# Patient Record
Sex: MALE | Race: BLACK OR AFRICAN AMERICAN | NOT HISPANIC OR LATINO | Employment: OTHER | URBAN - METROPOLITAN AREA
[De-identification: names, ages, dates, MRNs, and addresses within clinical notes are randomized per-mention and may not be internally consistent; named-entity substitution may affect disease eponyms.]

---

## 2023-03-15 PROBLEM — F10.10 ALCOHOL ABUSE: Status: ACTIVE | Noted: 2023-03-15

## 2023-03-15 PROBLEM — E78.5 HYPERLIPIDEMIA: Status: ACTIVE | Noted: 2023-03-15

## 2023-03-15 PROBLEM — D64.9 HEMOGLOBIN LOW: Status: ACTIVE | Noted: 2023-03-15

## 2023-03-15 PROBLEM — E04.1 LEFT THYROID NODULE: Status: ACTIVE | Noted: 2023-03-15

## 2023-03-15 PROBLEM — E79.0 HYPERURICEMIA: Status: ACTIVE | Noted: 2023-03-15

## 2023-03-15 PROBLEM — R91.1 SOLITARY PULMONARY NODULE ON LUNG CT: Status: ACTIVE | Noted: 2023-03-15

## 2023-03-15 PROBLEM — N18.31 CKD STAGE G3A/A2, GFR 45-59 AND ALBUMIN CREATININE RATIO 30-299 MG/G (MULTI): Status: ACTIVE | Noted: 2023-03-15

## 2023-03-15 PROBLEM — D12.6 TUBULAR ADENOMA OF COLON: Status: ACTIVE | Noted: 2023-03-15

## 2023-03-15 PROBLEM — E04.2 MULTIPLE THYROID NODULES: Status: ACTIVE | Noted: 2023-03-15

## 2023-03-15 PROBLEM — F41.9 ANXIETY AND DEPRESSION: Status: ACTIVE | Noted: 2023-03-15

## 2023-03-15 PROBLEM — R80.9 PROTEINURIA: Status: ACTIVE | Noted: 2023-03-15

## 2023-03-15 PROBLEM — N40.0 BENIGN ENLARGEMENT OF PROSTATE: Status: ACTIVE | Noted: 2023-03-15

## 2023-03-15 PROBLEM — M25.579 ARTHRALGIA OF ANKLE: Status: ACTIVE | Noted: 2023-03-15

## 2023-03-15 PROBLEM — R90.82 WHITE MATTER DISEASE, UNSPECIFIED: Status: ACTIVE | Noted: 2023-03-15

## 2023-03-15 PROBLEM — D64.9 ANEMIA: Status: ACTIVE | Noted: 2023-03-15

## 2023-03-15 PROBLEM — N18.1 BENIGN HYPERTENSION WITH CKD (CHRONIC KIDNEY DISEASE) STAGE I: Status: ACTIVE | Noted: 2023-03-15

## 2023-03-15 PROBLEM — I10 HYPERTENSION: Status: ACTIVE | Noted: 2023-03-15

## 2023-03-15 PROBLEM — D47.2 MGUS (MONOCLONAL GAMMOPATHY OF UNKNOWN SIGNIFICANCE): Status: ACTIVE | Noted: 2023-03-15

## 2023-03-15 PROBLEM — M15.0 PRIMARY OSTEOARTHRITIS INVOLVING MULTIPLE JOINTS: Status: ACTIVE | Noted: 2023-03-15

## 2023-03-15 PROBLEM — I95.89 IATROGENIC HYPOTENSION: Status: ACTIVE | Noted: 2023-03-15

## 2023-03-15 PROBLEM — D52.0 ANEMIA, MACROCYTIC, NUTRITIONAL: Status: ACTIVE | Noted: 2023-03-15

## 2023-03-15 PROBLEM — N28.9 RENAL INSUFFICIENCY: Status: ACTIVE | Noted: 2023-03-15

## 2023-03-15 PROBLEM — I12.9 BENIGN HYPERTENSION WITH CKD (CHRONIC KIDNEY DISEASE) STAGE I: Status: ACTIVE | Noted: 2023-03-15

## 2023-03-15 PROBLEM — E55.9 VITAMIN D DEFICIENCY: Status: ACTIVE | Noted: 2023-03-15

## 2023-03-15 PROBLEM — I71.40 ANEURYSM OF ABDOMINAL AORTA (CMS-HCC): Status: ACTIVE | Noted: 2023-03-15

## 2023-03-15 PROBLEM — F32.A ANXIETY AND DEPRESSION: Status: ACTIVE | Noted: 2023-03-15

## 2023-03-15 PROBLEM — M15.9 PRIMARY OSTEOARTHRITIS INVOLVING MULTIPLE JOINTS: Status: ACTIVE | Noted: 2023-03-15

## 2023-03-15 PROBLEM — R19.5 POSITIVE OCCULT STOOL BLOOD TEST: Status: ACTIVE | Noted: 2023-03-15

## 2023-03-15 PROBLEM — M13.0 ARTHRITIS OF MULTIPLE SITES: Status: ACTIVE | Noted: 2023-03-15

## 2023-03-15 PROBLEM — I73.9 PERIPHERAL VASCULAR DISEASE (CMS-HCC): Status: ACTIVE | Noted: 2023-03-15

## 2023-03-15 PROBLEM — I73.9 INTERMITTENT CLAUDICATION (CMS-HCC): Status: ACTIVE | Noted: 2023-03-15

## 2023-03-15 RX ORDER — METOPROLOL SUCCINATE 25 MG/1
1 TABLET, EXTENDED RELEASE ORAL DAILY
COMMUNITY
Start: 2020-08-19 | End: 2023-03-16 | Stop reason: SDUPTHER

## 2023-03-15 RX ORDER — AMLODIPINE BESYLATE 5 MG/1
1 TABLET ORAL DAILY
COMMUNITY
Start: 2018-04-25 | End: 2023-03-16 | Stop reason: SDUPTHER

## 2023-03-15 RX ORDER — ROSUVASTATIN CALCIUM 5 MG/1
1 TABLET, COATED ORAL DAILY
COMMUNITY
End: 2023-03-19 | Stop reason: SDUPTHER

## 2023-03-15 RX ORDER — ASPIRIN 81 MG/1
1 TABLET ORAL DAILY
COMMUNITY
Start: 2017-02-08

## 2023-03-15 RX ORDER — ALLOPURINOL 300 MG/1
1 TABLET ORAL DAILY
COMMUNITY
Start: 2014-06-08 | End: 2023-03-16 | Stop reason: SDUPTHER

## 2023-03-15 RX ORDER — FERROUS SULFATE 325(65) MG
TABLET ORAL
COMMUNITY
Start: 2016-12-09

## 2023-03-16 ENCOUNTER — OFFICE VISIT (OUTPATIENT)
Dept: PRIMARY CARE | Facility: CLINIC | Age: 78
End: 2023-03-16
Payer: MEDICARE

## 2023-03-16 VITALS
DIASTOLIC BLOOD PRESSURE: 80 MMHG | BODY MASS INDEX: 28.94 KG/M2 | HEART RATE: 70 BPM | WEIGHT: 196 LBS | SYSTOLIC BLOOD PRESSURE: 122 MMHG

## 2023-03-16 DIAGNOSIS — E55.9 VITAMIN D DEFICIENCY: ICD-10-CM

## 2023-03-16 DIAGNOSIS — K21.00 GASTROESOPHAGEAL REFLUX DISEASE WITH ESOPHAGITIS WITHOUT HEMORRHAGE: ICD-10-CM

## 2023-03-16 DIAGNOSIS — N28.9 RENAL INSUFFICIENCY: ICD-10-CM

## 2023-03-16 DIAGNOSIS — F32.A ANXIETY AND DEPRESSION: ICD-10-CM

## 2023-03-16 DIAGNOSIS — E79.0 HYPERURICEMIA: ICD-10-CM

## 2023-03-16 DIAGNOSIS — R09.A2 GLOBUS SENSATION: ICD-10-CM

## 2023-03-16 DIAGNOSIS — N18.1 BENIGN HYPERTENSION WITH CKD (CHRONIC KIDNEY DISEASE) STAGE I: ICD-10-CM

## 2023-03-16 DIAGNOSIS — I12.9 BENIGN HYPERTENSION WITH CKD (CHRONIC KIDNEY DISEASE) STAGE I: ICD-10-CM

## 2023-03-16 DIAGNOSIS — F41.9 ANXIETY AND DEPRESSION: ICD-10-CM

## 2023-03-16 DIAGNOSIS — Z12.11 COLON CANCER SCREENING: ICD-10-CM

## 2023-03-16 DIAGNOSIS — E78.2 MIXED HYPERLIPIDEMIA: Primary | ICD-10-CM

## 2023-03-16 DIAGNOSIS — N18.31 CKD STAGE G3A/A2, GFR 45-59 AND ALBUMIN CREATININE RATIO 30-299 MG/G (MULTI): ICD-10-CM

## 2023-03-16 PROCEDURE — 3079F DIAST BP 80-89 MM HG: CPT | Performed by: FAMILY MEDICINE

## 2023-03-16 PROCEDURE — 3074F SYST BP LT 130 MM HG: CPT | Performed by: FAMILY MEDICINE

## 2023-03-16 PROCEDURE — 1036F TOBACCO NON-USER: CPT | Performed by: FAMILY MEDICINE

## 2023-03-16 PROCEDURE — 1159F MED LIST DOCD IN RCRD: CPT | Performed by: FAMILY MEDICINE

## 2023-03-16 PROCEDURE — 1160F RVW MEDS BY RX/DR IN RCRD: CPT | Performed by: FAMILY MEDICINE

## 2023-03-16 PROCEDURE — 99214 OFFICE O/P EST MOD 30 MIN: CPT | Performed by: FAMILY MEDICINE

## 2023-03-16 RX ORDER — ALLOPURINOL 300 MG/1
300 TABLET ORAL DAILY
Qty: 90 TABLET | Refills: 1 | Status: SHIPPED | OUTPATIENT
Start: 2023-03-16 | End: 2023-08-24 | Stop reason: SDUPTHER

## 2023-03-16 RX ORDER — AMLODIPINE BESYLATE 5 MG/1
5 TABLET ORAL DAILY
Qty: 90 TABLET | Refills: 1 | Status: SHIPPED | OUTPATIENT
Start: 2023-03-16 | End: 2023-08-24 | Stop reason: SDUPTHER

## 2023-03-16 RX ORDER — METOPROLOL SUCCINATE 25 MG/1
25 TABLET, EXTENDED RELEASE ORAL DAILY
Qty: 90 TABLET | Refills: 1 | Status: SHIPPED | OUTPATIENT
Start: 2023-03-16 | End: 2023-08-24 | Stop reason: SDUPTHER

## 2023-03-16 ASSESSMENT — PATIENT HEALTH QUESTIONNAIRE - PHQ9
SUM OF ALL RESPONSES TO PHQ9 QUESTIONS 1 AND 2: 0
2. FEELING DOWN, DEPRESSED OR HOPELESS: NOT AT ALL
1. LITTLE INTEREST OR PLEASURE IN DOING THINGS: NOT AT ALL

## 2023-03-16 ASSESSMENT — ENCOUNTER SYMPTOMS
LOSS OF SENSATION IN FEET: 0
DEPRESSION: 0
OCCASIONAL FEELINGS OF UNSTEADINESS: 0

## 2023-03-16 NOTE — PROGRESS NOTES
Subjective   Patient ID: Raúl Cruz is a 77 y.o. male who presents for Hyperlipidemia and Hypertension.    HPI  Victor Manuel's wife passed away in September 2022, and he continues to have anxiety, misses his wife greatly, and finds that his physical activity is rather sedentary because he feels that since he is retired, and his wife is now gone, so he has nothing to do, therefore, he does not want to do much.    Denies any chest pain, shortness of breath any other cardiac/respiratory concerns, requesting refills on medications that he is currently taking.    In addition to his prescription medications, he is taking a vitamin B complex as well.    2016 had a colonoscopy and upper endoscopy, with a 5-year clearance for his colonoscopy but he has yet to do the colonoscopy mainly because he was concerned about the prep for it.  Knowing that they have tablets for the colonoscopy preparation, he is a bit more inclined to consider having the colonoscopy done.    Initially when he made this appointment, he was concerned about a possible white spot on the back of his throat, but he describes it as almost a marble appearing object, usually seen when he opens his mouth widely, and it has a glistening surface.  He feels like he has something stuck in his throat, but it is below the Diego's apple, not in the back of the throat.  Denies any sore throat, fever or chills, or other constitutional symptoms.  In fact, he has no difficulty swallowing, no painful swallowing, and denies any reflux-like symptoms.    Victor Manuel has a history of hyperuricemia, hypertension, and hyperlipidemia.  Willing to have blood work done, but not fasting today.    Review of Systems  All pertinent positive symptoms are included in the history of present illness.    All other systems have been reviewed and are negative and noncontributory to this patient's current ailments.     Allergies   Allergen Reactions    Pollen Extracts Itching and Unknown         Immunization History   Administered Date(s) Administered    Influenza, High Dose Seasonal, Preservative Free 11/01/2022    Pneumococcal Polysaccharide PPSV23 07/23/2019    Tdap 11/21/2016, 08/03/2022       Objective   Vitals:    03/16/23 1105   BP: 122/80   Pulse: 70   Weight: 88.9 kg (196 lb)       Physical Exam  CONSTITUTIONAL - well nourished, well developed, looks like stated age, in no acute distress, not ill-appearing, and not tired appearing  SKIN - normal skin color and pigmentation, normal skin turgor without rash, lesions, or nodules visualized  HEAD - no trauma, normocephalic  EYES - pupils are equal and reactive to light, extraocular muscles are intact, and normal external exam  NECK - supple without rigidity, no neck mass was observed, no thyromegaly or thyroid nodules  CHEST - clear to auscultation, no wheezing, no crackles and no rales, good effort  CARDIAC - regular rate and regular rhythm, no skipped beats, no murmur  ABDOMEN - no organomegaly, soft, nontender, nondistended, normal bowel sounds, no guarding/rebound/rigidity, negative McBurney sign and negative Quick sign  EXTREMITIES - no edema, no deformities  PSYCHIATRIC - alert, pleasant and cordial, age-appropriate  IMMUNOLOGIC - no cervical lymphadenopathy     Assessment/Plan   1.  Hyperlipidemia.  Please obtain fasting blood work, we will send medication over once I review blood work to determine appropriate dosing    2.  Hyperuricemia.  Uric acid level pending, we will review and send medication over accordingly    3.  Anxiety and depression.  I am so sorry to hear about the loss of your wife  I suspect that she is in a much better place than on our planet, and certainly is not suffering anymore  Not using medication for this concern currently, but if things change, and you need my help, do not hesitate to contact me    4.  CKD.  We will monitor kidney function to ensure stable  We will notify of test results once available and make  treatment recommendations accordingly    5.  Globus sensation/GERD.  I am not certain if there is any anatomical pathologic process with the feeling that something is in your throat  You have been under a great deal of stress since your wife passed, as everything is changing, and now you are responsible for so many different things that you were not responsible before her death  Because we talked about considering a screening colonoscopy, which is due, I am going to suggest an upper endoscopy to further evaluate the esophagus and if there is pathology, it will be evaluated properly with further recommendations to follow    6.  Colonoscopy screening.  You are due in 2021, you have been hesitant to do it, but reluctantly you have allow me to place the order so I will do so    7.  Hypertension.  Stable, no changes to medication recommended

## 2023-03-17 ENCOUNTER — LAB (OUTPATIENT)
Dept: LAB | Facility: LAB | Age: 78
End: 2023-03-17
Payer: MEDICARE

## 2023-03-17 DIAGNOSIS — E55.9 VITAMIN D DEFICIENCY: ICD-10-CM

## 2023-03-17 DIAGNOSIS — E78.2 MIXED HYPERLIPIDEMIA: ICD-10-CM

## 2023-03-17 DIAGNOSIS — F32.A ANXIETY AND DEPRESSION: ICD-10-CM

## 2023-03-17 DIAGNOSIS — N18.31 CKD STAGE G3A/A2, GFR 45-59 AND ALBUMIN CREATININE RATIO 30-299 MG/G (MULTI): ICD-10-CM

## 2023-03-17 DIAGNOSIS — E79.0 HYPERURICEMIA: ICD-10-CM

## 2023-03-17 DIAGNOSIS — F41.9 ANXIETY AND DEPRESSION: ICD-10-CM

## 2023-03-17 DIAGNOSIS — N28.9 RENAL INSUFFICIENCY: ICD-10-CM

## 2023-03-17 LAB
ALANINE AMINOTRANSFERASE (SGPT) (U/L) IN SER/PLAS: 16 U/L (ref 10–52)
ALBUMIN (G/DL) IN SER/PLAS: 4.5 G/DL (ref 3.4–5)
ALKALINE PHOSPHATASE (U/L) IN SER/PLAS: 54 U/L (ref 33–136)
ANION GAP IN SER/PLAS: 14 MMOL/L (ref 10–20)
ASPARTATE AMINOTRANSFERASE (SGOT) (U/L) IN SER/PLAS: 23 U/L (ref 9–39)
BASOPHILS (10*3/UL) IN BLOOD BY AUTOMATED COUNT: 0.05 X10E9/L (ref 0–0.1)
BASOPHILS/100 LEUKOCYTES IN BLOOD BY AUTOMATED COUNT: 0.8 % (ref 0–2)
BILIRUBIN TOTAL (MG/DL) IN SER/PLAS: 0.7 MG/DL (ref 0–1.2)
CALCIUM (MG/DL) IN SER/PLAS: 10.4 MG/DL (ref 8.6–10.6)
CARBON DIOXIDE, TOTAL (MMOL/L) IN SER/PLAS: 28 MMOL/L (ref 21–32)
CHLORIDE (MMOL/L) IN SER/PLAS: 105 MMOL/L (ref 98–107)
CHOLESTEROL (MG/DL) IN SER/PLAS: 95 MG/DL (ref 0–199)
CHOLESTEROL IN HDL (MG/DL) IN SER/PLAS: 39.1 MG/DL
CHOLESTEROL/HDL RATIO: 2.4
CREATININE (MG/DL) IN SER/PLAS: 1.36 MG/DL (ref 0.5–1.3)
EOSINOPHILS (10*3/UL) IN BLOOD BY AUTOMATED COUNT: 0.08 X10E9/L (ref 0–0.4)
EOSINOPHILS/100 LEUKOCYTES IN BLOOD BY AUTOMATED COUNT: 1.3 % (ref 0–6)
ERYTHROCYTE DISTRIBUTION WIDTH (RATIO) BY AUTOMATED COUNT: 14.2 % (ref 11.5–14.5)
ERYTHROCYTE MEAN CORPUSCULAR HEMOGLOBIN CONCENTRATION (G/DL) BY AUTOMATED: 30.9 G/DL (ref 32–36)
ERYTHROCYTE MEAN CORPUSCULAR VOLUME (FL) BY AUTOMATED COUNT: 97 FL (ref 80–100)
ERYTHROCYTES (10*6/UL) IN BLOOD BY AUTOMATED COUNT: 4.19 X10E12/L (ref 4.5–5.9)
GFR MALE: 53 ML/MIN/1.73M2
GLUCOSE (MG/DL) IN SER/PLAS: 97 MG/DL (ref 74–99)
HEMATOCRIT (%) IN BLOOD BY AUTOMATED COUNT: 40.5 % (ref 41–52)
HEMOGLOBIN (G/DL) IN BLOOD: 12.5 G/DL (ref 13.5–17.5)
IMMATURE GRANULOCYTES/100 LEUKOCYTES IN BLOOD BY AUTOMATED COUNT: 0.2 % (ref 0–0.9)
LDL: 32 MG/DL (ref 0–99)
LEUKOCYTES (10*3/UL) IN BLOOD BY AUTOMATED COUNT: 6.2 X10E9/L (ref 4.4–11.3)
LYMPHOCYTES (10*3/UL) IN BLOOD BY AUTOMATED COUNT: 2.24 X10E9/L (ref 0.8–3)
LYMPHOCYTES/100 LEUKOCYTES IN BLOOD BY AUTOMATED COUNT: 35.9 % (ref 13–44)
MONOCYTES (10*3/UL) IN BLOOD BY AUTOMATED COUNT: 0.48 X10E9/L (ref 0.05–0.8)
MONOCYTES/100 LEUKOCYTES IN BLOOD BY AUTOMATED COUNT: 7.7 % (ref 2–10)
NEUTROPHILS (10*3/UL) IN BLOOD BY AUTOMATED COUNT: 3.38 X10E9/L (ref 1.6–5.5)
NEUTROPHILS/100 LEUKOCYTES IN BLOOD BY AUTOMATED COUNT: 54.1 % (ref 40–80)
NRBC (PER 100 WBCS) BY AUTOMATED COUNT: 0 /100 WBC (ref 0–0)
PLATELETS (10*3/UL) IN BLOOD AUTOMATED COUNT: 260 X10E9/L (ref 150–450)
POTASSIUM (MMOL/L) IN SER/PLAS: 4.8 MMOL/L (ref 3.5–5.3)
PROTEIN TOTAL: 7 G/DL (ref 6.4–8.2)
SODIUM (MMOL/L) IN SER/PLAS: 142 MMOL/L (ref 136–145)
TRIGLYCERIDE (MG/DL) IN SER/PLAS: 118 MG/DL (ref 0–149)
URATE (MG/DL) IN SER/PLAS: 6.5 MG/DL (ref 4–7.5)
UREA NITROGEN (MG/DL) IN SER/PLAS: 11 MG/DL (ref 6–23)
VLDL: 24 MG/DL (ref 0–40)

## 2023-03-17 PROCEDURE — 80053 COMPREHEN METABOLIC PANEL: CPT

## 2023-03-17 PROCEDURE — 36415 COLL VENOUS BLD VENIPUNCTURE: CPT

## 2023-03-17 PROCEDURE — 85025 COMPLETE CBC W/AUTO DIFF WBC: CPT

## 2023-03-17 PROCEDURE — 84550 ASSAY OF BLOOD/URIC ACID: CPT

## 2023-03-17 PROCEDURE — 80061 LIPID PANEL: CPT

## 2023-03-19 DIAGNOSIS — E78.2 MIXED HYPERLIPIDEMIA: Primary | ICD-10-CM

## 2023-03-19 RX ORDER — ROSUVASTATIN CALCIUM 5 MG/1
5 TABLET, COATED ORAL DAILY
Qty: 90 TABLET | Refills: 1 | Status: SHIPPED | OUTPATIENT
Start: 2023-03-19 | End: 2023-08-24 | Stop reason: SDUPTHER

## 2023-03-19 NOTE — RESULT ENCOUNTER NOTE
Sugar, electrolytes, liver normal    Kidney function shows some slight decline but consistent compared to previous    Cholesterol continues to look excellent at 95 with LDL 32 so no changes to medication recommended    There is once again indication of slight iron deficiency anemia so please continue the iron    Uric acid level is elevated at 6.5 we would like to see that below 6.0, so please continue to take the allopurinol 300 mg daily

## 2023-03-20 ENCOUNTER — TELEPHONE (OUTPATIENT)
Dept: PRIMARY CARE | Facility: CLINIC | Age: 78
End: 2023-03-20
Payer: MEDICARE

## 2023-03-20 NOTE — TELEPHONE ENCOUNTER
----- Message from Viet Del Cid, DO sent at 3/19/2023  6:57 PM EDT -----  Sugar, electrolytes, liver normal    Kidney function shows some slight decline but consistent compared to previous    Cholesterol continues to look excellent at 95 with LDL 32 so no changes to medication recommended    There is once again indication of slight iron deficiency anemia so please continue the iron    Uric acid level is elevated at 6.5 we would like to see that below 6.0, so please continue to take the allopurinol 300 mg daily

## 2023-03-22 ENCOUNTER — APPOINTMENT (OUTPATIENT)
Dept: PRIMARY CARE | Facility: CLINIC | Age: 78
End: 2023-03-22
Payer: MEDICARE

## 2023-08-23 ENCOUNTER — LAB (OUTPATIENT)
Dept: LAB | Facility: LAB | Age: 78
End: 2023-08-23
Payer: MEDICARE

## 2023-08-23 ENCOUNTER — OFFICE VISIT (OUTPATIENT)
Dept: PRIMARY CARE | Facility: CLINIC | Age: 78
End: 2023-08-23
Payer: MEDICARE

## 2023-08-23 VITALS
WEIGHT: 186 LBS | SYSTOLIC BLOOD PRESSURE: 130 MMHG | DIASTOLIC BLOOD PRESSURE: 74 MMHG | HEART RATE: 70 BPM | BODY MASS INDEX: 27.47 KG/M2

## 2023-08-23 DIAGNOSIS — I10 PRIMARY HYPERTENSION: ICD-10-CM

## 2023-08-23 DIAGNOSIS — F45.0 ANXIETY WITH SOMATIZATION: ICD-10-CM

## 2023-08-23 DIAGNOSIS — Z12.11 COLON CANCER SCREENING: ICD-10-CM

## 2023-08-23 DIAGNOSIS — F41.9 ANXIETY AND DEPRESSION: ICD-10-CM

## 2023-08-23 DIAGNOSIS — Z00.00 MEDICARE ANNUAL WELLNESS VISIT, SUBSEQUENT: Primary | ICD-10-CM

## 2023-08-23 DIAGNOSIS — F32.A ANXIETY AND DEPRESSION: ICD-10-CM

## 2023-08-23 DIAGNOSIS — E78.2 MIXED HYPERLIPIDEMIA: ICD-10-CM

## 2023-08-23 DIAGNOSIS — Z00.00 PHYSICAL EXAM, ANNUAL: ICD-10-CM

## 2023-08-23 DIAGNOSIS — E79.0 HYPERURICEMIA: ICD-10-CM

## 2023-08-23 DIAGNOSIS — Z12.5 PROSTATE CANCER SCREENING: ICD-10-CM

## 2023-08-23 DIAGNOSIS — F41.9 ANXIETY WITH SOMATIZATION: ICD-10-CM

## 2023-08-23 DIAGNOSIS — D51.8 OTHER VITAMIN B12 DEFICIENCY ANEMIA: ICD-10-CM

## 2023-08-23 DIAGNOSIS — M13.0 ARTHRITIS OF MULTIPLE SITES: ICD-10-CM

## 2023-08-23 PROBLEM — Z86.010 HISTORY OF ADENOMATOUS POLYP OF COLON: Status: ACTIVE | Noted: 2023-08-23

## 2023-08-23 PROBLEM — R55 SYNCOPE AND COLLAPSE: Status: ACTIVE | Noted: 2023-08-23

## 2023-08-23 PROBLEM — M25.579 ARTHRALGIA OF ANKLE: Status: RESOLVED | Noted: 2023-03-15 | Resolved: 2023-08-23

## 2023-08-23 PROBLEM — R09.A2 GLOBUS SENSATION: Status: ACTIVE | Noted: 2023-08-23

## 2023-08-23 PROBLEM — I73.9 PERIPHERAL VASCULAR DISEASE (CMS-HCC): Status: RESOLVED | Noted: 2023-03-15 | Resolved: 2023-08-23

## 2023-08-23 PROBLEM — I73.9 INTERMITTENT CLAUDICATION (CMS-HCC): Status: RESOLVED | Noted: 2023-03-15 | Resolved: 2023-08-23

## 2023-08-23 PROBLEM — R90.82 WHITE MATTER DISEASE, UNSPECIFIED: Status: RESOLVED | Noted: 2023-03-15 | Resolved: 2023-08-23

## 2023-08-23 PROBLEM — I71.40 ANEURYSM OF ABDOMINAL AORTA (CMS-HCC): Status: RESOLVED | Noted: 2023-03-15 | Resolved: 2023-08-23

## 2023-08-23 PROBLEM — R55 SYNCOPE AND COLLAPSE: Status: RESOLVED | Noted: 2023-08-23 | Resolved: 2023-08-23

## 2023-08-23 PROBLEM — Z86.0101 HISTORY OF ADENOMATOUS POLYP OF COLON: Status: ACTIVE | Noted: 2023-08-23

## 2023-08-23 LAB
ALANINE AMINOTRANSFERASE (SGPT) (U/L) IN SER/PLAS: 26 U/L (ref 10–52)
ALBUMIN (G/DL) IN SER/PLAS: 4.6 G/DL (ref 3.4–5)
ALKALINE PHOSPHATASE (U/L) IN SER/PLAS: 51 U/L (ref 33–136)
ANION GAP IN SER/PLAS: 12 MMOL/L (ref 10–20)
ASPARTATE AMINOTRANSFERASE (SGOT) (U/L) IN SER/PLAS: 50 U/L (ref 9–39)
BILIRUBIN TOTAL (MG/DL) IN SER/PLAS: 0.5 MG/DL (ref 0–1.2)
CALCIUM (MG/DL) IN SER/PLAS: 9.7 MG/DL (ref 8.6–10.3)
CARBON DIOXIDE, TOTAL (MMOL/L) IN SER/PLAS: 23 MMOL/L (ref 21–32)
CHLORIDE (MMOL/L) IN SER/PLAS: 109 MMOL/L (ref 98–107)
CHOLESTEROL (MG/DL) IN SER/PLAS: 100 MG/DL (ref 0–199)
CHOLESTEROL IN HDL (MG/DL) IN SER/PLAS: 41 MG/DL
CHOLESTEROL/HDL RATIO: 2.4
CREATININE (MG/DL) IN SER/PLAS: 1.26 MG/DL (ref 0.5–1.3)
GFR MALE: 58 ML/MIN/1.73M2
GLUCOSE (MG/DL) IN SER/PLAS: 102 MG/DL (ref 74–99)
LDL: 34 MG/DL (ref 0–99)
POTASSIUM (MMOL/L) IN SER/PLAS: 5.3 MMOL/L (ref 3.5–5.3)
PROSTATE SPECIFIC ANTIGEN,SCREEN: 1.87 NG/ML (ref 0–4)
PROTEIN TOTAL: 7.6 G/DL (ref 6.4–8.2)
RHEUMATOID FACTOR (IU/ML) IN SERUM OR PLASMA: <10 IU/ML (ref 0–15)
SODIUM (MMOL/L) IN SER/PLAS: 139 MMOL/L (ref 136–145)
TRIGLYCERIDE (MG/DL) IN SER/PLAS: 124 MG/DL (ref 0–149)
URATE (MG/DL) IN SER/PLAS: 5 MG/DL (ref 4–7.5)
UREA NITROGEN (MG/DL) IN SER/PLAS: 10 MG/DL (ref 6–23)
VLDL: 25 MG/DL (ref 0–40)

## 2023-08-23 PROCEDURE — 1170F FXNL STATUS ASSESSED: CPT | Performed by: FAMILY MEDICINE

## 2023-08-23 PROCEDURE — G0103 PSA SCREENING: HCPCS

## 2023-08-23 PROCEDURE — 1160F RVW MEDS BY RX/DR IN RCRD: CPT | Performed by: FAMILY MEDICINE

## 2023-08-23 PROCEDURE — 84550 ASSAY OF BLOOD/URIC ACID: CPT

## 2023-08-23 PROCEDURE — 99397 PER PM REEVAL EST PAT 65+ YR: CPT | Performed by: FAMILY MEDICINE

## 2023-08-23 PROCEDURE — 80053 COMPREHEN METABOLIC PANEL: CPT

## 2023-08-23 PROCEDURE — 99214 OFFICE O/P EST MOD 30 MIN: CPT | Performed by: FAMILY MEDICINE

## 2023-08-23 PROCEDURE — 1159F MED LIST DOCD IN RCRD: CPT | Performed by: FAMILY MEDICINE

## 2023-08-23 PROCEDURE — 86038 ANTINUCLEAR ANTIBODIES: CPT

## 2023-08-23 PROCEDURE — 80061 LIPID PANEL: CPT

## 2023-08-23 PROCEDURE — 3078F DIAST BP <80 MM HG: CPT | Performed by: FAMILY MEDICINE

## 2023-08-23 PROCEDURE — 1036F TOBACCO NON-USER: CPT | Performed by: FAMILY MEDICINE

## 2023-08-23 PROCEDURE — 36415 COLL VENOUS BLD VENIPUNCTURE: CPT

## 2023-08-23 PROCEDURE — G0439 PPPS, SUBSEQ VISIT: HCPCS | Performed by: FAMILY MEDICINE

## 2023-08-23 PROCEDURE — 1126F AMNT PAIN NOTED NONE PRSNT: CPT | Performed by: FAMILY MEDICINE

## 2023-08-23 PROCEDURE — 86431 RHEUMATOID FACTOR QUANT: CPT

## 2023-08-23 PROCEDURE — 3075F SYST BP GE 130 - 139MM HG: CPT | Performed by: FAMILY MEDICINE

## 2023-08-23 RX ORDER — ESCITALOPRAM OXALATE 10 MG/1
TABLET ORAL
Qty: 30 TABLET | Refills: 5 | Status: SHIPPED | OUTPATIENT
Start: 2023-08-23

## 2023-08-23 ASSESSMENT — ACTIVITIES OF DAILY LIVING (ADL)
MANAGING_FINANCES: INDEPENDENT
BATHING: INDEPENDENT
DOING_HOUSEWORK: INDEPENDENT
GROCERY_SHOPPING: INDEPENDENT
DRESSING: INDEPENDENT
TAKING_MEDICATION: INDEPENDENT

## 2023-08-23 ASSESSMENT — ENCOUNTER SYMPTOMS
OCCASIONAL FEELINGS OF UNSTEADINESS: 0
DEPRESSION: 0
LOSS OF SENSATION IN FEET: 0

## 2023-08-23 NOTE — ASSESSMENT & PLAN NOTE
Requisition for CBC, CMP, TSH, lipid, A1c, PSA has been provided to you  We will notify of test results once available and make treatment recommendations accordingly     Please call the office with any questions/concerns. Please follow up in 6 months for a health maintenance examination.

## 2023-08-23 NOTE — ASSESSMENT & PLAN NOTE
Given history of alcohol use and anemia, there are few options for pain management  Continue use of acetaminophen  Will check arthritis panel to evaluate for SLE or any other underlying causes  Will refer to rheumatology for further management

## 2023-08-23 NOTE — PROGRESS NOTES
Subjective   Reason for Visit: Raúl Cruz is an 77 y.o. male here for a Arthritis, Medicare Annual Wellness Visit Subsequent, Annual Exam, Anxiety, and Anemia.     Past Medical, Surgical, and Family History reviewed and updated in chart.    Reviewed all medications by prescribing practitioner or clinical pharmacist (such as prescriptions, OTCs, herbal therapies and supplements) and documented in the medical record.    HPI  1.  Medicare wellness/physical exam.  No acute concerns today  Last colonoscopy 2016, 5-year clearance, has been attempting to coordinate appointment for next one, but having difficult time finding a ride  Tdap 2022    2.  Anxiety with somatic symptoms.  Victor Manuel has been grappling with persistent anxiety since the passing of his wife last year. He has been encountering anxiety related to various aspects, including managing his wife's estate, concerns about his health, handling daily life tasks, and even experiencing panic attacks. These panic attacks are accompanied by headaches that present in a bilateral occipital distribution.    The nature of the headaches is described as a dull ache, almost like a band encircling his head, with the sensation wrapping around to the front of his head. Pressure is often felt around the right eye, occasionally affecting the left eye as well. The patient also mentions tenderness when the temples are palpated, and he sometimes finds relief through massaging this area.    These symptoms often prompt him to seek rest by lying down and closing his eyes. However, this becomes a source of concern, particularly when he is driving. Typically, these symptoms endure for around 30-45 minutes before subsiding. Interestingly, he experienced similar symptoms, including anxiety, panic attacks, and comparable headaches, around 30 years ago.    Over the past year, he has experienced these episodes roughly once per month, but notably, their frequency has intensified in the last  "month. The panic attacks he experiences are characterized as non-hyperventilatory, lacking palpitations. Nevertheless, he does report feelings of nausea, irritability, and anger during these episodes.    3.  Arthritis flare  The patient is currently dealing with widespread arthritis and joint pain affecting various parts of the body, including the hands and feet. This pain is considered chronic and, due to a history of anemia, has predominantly been managed with acetaminophen. To avoid exacerbating his anemia, the patient has refrained from using nonsteroidal anti-inflammatory drugs (NSAIDs) for pain relief.    The patient additionally reports a sensation in his feet akin to them \"always going to sleep.\" This symptom has not been formally evaluated thus far. In light of these ongoing challenges, the patient is now seeking additional avenues for pain management beyond his current regimen.    4.  Hyperuricemia  Taking allopurinol 100 mg daily, tolerating well     5.  Anemia  Currently taking iron OTC daily, tolerates well    Review of Systems  All pertinent positive symptoms are included in the history of present illness.    All other systems have been reviewed and are negative and noncontributory to this patient's current ailments.    Current Outpatient Medications   Medication Instructions    allopurinol (ZYLOPRIM) 300 mg, oral, Daily    amLODIPine (NORVASC) 5 mg, oral, Daily    aspirin 81 mg EC tablet 1 tablet, oral, Daily    escitalopram (Lexapro) 10 mg tablet Take 1/2 tablet daily x7 days then 1 tablet daily thereafter    ferrous sulfate 325 (65 Fe) MG tablet oral    metoprolol succinate XL (TOPROL-XL) 25 mg, oral, Daily    rosuvastatin (CRESTOR) 5 mg, oral, Daily     Allergies   Allergen Reactions    Pollen Extracts Itching and Unknown     Immunization History   Administered Date(s) Administered    Influenza, seasonal, injectable 11/01/2022    Pfizer Purple Cap SARS-CoV-2 03/27/2021, 04/17/2021, 12/19/2021    " Pneumococcal polysaccharide vaccine, 23-valent, age 2 years and older (PNEUMOVAX 23) 07/23/2019    Tdap vaccine, age 10 years and older (BOOSTRIX) 11/21/2016, 08/03/2022     Past Surgical History:   Procedure Laterality Date    APPENDECTOMY  11/12/2013    Appendectomy    CT ABDOMEN PELVIS ANGIOGRAM W AND/OR WO IV CONTRAST  4/16/2015    CT ABDOMEN PELVIS ANGIOGRAM W AND/OR WO IV CONTRAST 4/16/2015 [facility] ANCILLARY LEGACY    CT ABDOMEN PELVIS ANGIOGRAM W AND/OR WO IV CONTRAST  7/5/2021    CT ABDOMEN PELVIS ANGIOGRAM W AND/OR WO IV CONTRAST 7/5/2021 [facility] CLINICAL LEGACY    CT ABDOMEN PELVIS ANGIOGRAM W AND/OR WO IV CONTRAST  10/30/2017    CT ABDOMEN PELVIS ANGIOGRAM W AND/OR WO IV CONTRAST 10/30/2017 [facility] ANCILLARY LEGACY    OTHER SURGICAL HISTORY  11/12/2013    Iliac Thromboendarterectomy    OTHER SURGICAL HISTORY  04/14/2014    Surgery For Abdominal Aortic Aneurysm     Family History   Problem Relation Name Age of Onset    Liver cancer Mother      Cancer Brother      Hypertension Other         Social History     Tobacco Use    Smoking status: Former     Types: Cigarettes    Smokeless tobacco: Never   Substance Use Topics    Alcohol use: Yes     Patient Self Assessment of Health Status  Patient Self Assessment: Good    Nutrition and Exercise  Current Diet: Well Balanced Diet  Adequate Fluid Intake: Yes  Caffeine: Yes  Exercise Frequency: Infrequently    Functional Ability/Level of Safety  Cognitive Impairment Observed: No cognitive impairment observed  Cognitive Impairment Reported: No cognitive impairment reported by patient or family    Home Safety Risk Factors: None    Objective   Visit Vitals  /74   Pulse 70   Wt 84.4 kg (186 lb)   BMI 27.47 kg/m²   Smoking Status Former   BSA 2.03 m²      Physical Exam  CONSTITUTIONAL - well nourished, well developed, looks like stated age, in no acute distress, not ill-appearing, and not tired appearing  SKIN - normal skin color and pigmentation, normal skin turgor without  rash, lesions, or nodules visualized  HEAD - no trauma, normocephalic  EYES - normal external exam  CHEST - clear to auscultation, no wheezing, no crackles and no rales, good effort  CARDIAC - regular rate and regular rhythm, no skipped beats, no murmur  EXTREMITIES - no edema, no deformities  NEUROLOGICAL - normal balance, normal motor, no ataxia  PSYCHIATRIC - alert, pleasant and cordial, age-appropriate    Assessment/Plan   Problem List Items Addressed This Visit       Anemia    Current Assessment & Plan     Continue with current management plan          Anxiety with somatization    Current Assessment & Plan     Suspect that many of your physical manifestations you discussed in the HPI may be related to uncontrolled anxiety  Start Lexapro 5 mg daily x7 days and then 10 mg daily thereafter  Notify office in 3 to 4 weeks regarding efficacy and tolerability after which further adjustments can be made    Risks, benefits, and options of treatment(s) were discussed after reviewing all current medication(s) and drug allergy(ies)  I opted for the treatment that we discussed with instructions on the medication use for your underlying medical ailment(s)         Relevant Medications    escitalopram (Lexapro) 10 mg tablet    Arthritis of multiple sites    Current Assessment & Plan     Given history of alcohol use and anemia, there are few options for pain management  Continue use of acetaminophen  Will check arthritis panel to evaluate for SLE or any other underlying causes  Will refer to rheumatology for further management         Relevant Orders    Arthritis Panel (CMS)    Hyperlipidemia    Current Assessment & Plan     Stable, no changes to medication recommended  Please obtain fasting blood work to further evaluate to ensure proper dosing         Relevant Orders    Lipid Panel    Hypertension    Current Assessment & Plan     Stable, no changes to medication recommended         Relevant Orders    Comprehensive Metabolic  Panel    Hyperuricemia    Current Assessment & Plan     Continue on current management plan  Will check uric acid levels today         Relevant Orders    Uric acid     Other Visit Diagnoses       Medicare annual wellness visit, subsequent    -  Primary    Questions were reviewed and answered  Colonoscopy needs to get done  Immunizations up-to-date    Physical exam, annual        Complete history and physical examination was performed  We will notify of test results once available    Colon cancer screening        Relevant Orders    Colonoscopy Screening    Prostate cancer screening        Relevant Orders    Prostate Specific Antigen, Screen          Patient Care Team:  Viet Del Cid DO as PCP - General (Family Medicine)  Torres Prajapati MD as PCP - [employer] PCP

## 2023-08-23 NOTE — ASSESSMENT & PLAN NOTE
Stable, no changes to medication recommended  Please obtain fasting blood work to further evaluate to ensure proper dosing

## 2023-08-23 NOTE — ASSESSMENT & PLAN NOTE
Suspect that many of your physical manifestations you discussed in the HPI may be related to uncontrolled anxiety  Start Lexapro 5 mg daily x7 days and then 10 mg daily thereafter  Notify office in 3 to 4 weeks regarding efficacy and tolerability after which further adjustments can be made    Risks, benefits, and options of treatment(s) were discussed after reviewing all current medication(s) and drug allergy(ies)  I opted for the treatment that we discussed with instructions on the medication use for your underlying medical ailment(s)

## 2023-08-24 DIAGNOSIS — N18.1 BENIGN HYPERTENSION WITH CKD (CHRONIC KIDNEY DISEASE) STAGE I: ICD-10-CM

## 2023-08-24 DIAGNOSIS — I12.9 BENIGN HYPERTENSION WITH CKD (CHRONIC KIDNEY DISEASE) STAGE I: ICD-10-CM

## 2023-08-24 DIAGNOSIS — E79.0 HYPERURICEMIA: ICD-10-CM

## 2023-08-24 DIAGNOSIS — E78.2 MIXED HYPERLIPIDEMIA: ICD-10-CM

## 2023-08-24 RX ORDER — AMLODIPINE BESYLATE 5 MG/1
5 TABLET ORAL DAILY
Qty: 90 TABLET | Refills: 1 | Status: SHIPPED | OUTPATIENT
Start: 2023-08-24 | End: 2024-02-20

## 2023-08-24 RX ORDER — METOPROLOL SUCCINATE 25 MG/1
25 TABLET, EXTENDED RELEASE ORAL DAILY
Qty: 90 TABLET | Refills: 1 | Status: SHIPPED | OUTPATIENT
Start: 2023-08-24 | End: 2024-02-20

## 2023-08-24 RX ORDER — ROSUVASTATIN CALCIUM 5 MG/1
5 TABLET, COATED ORAL DAILY
Qty: 90 TABLET | Refills: 1 | Status: SHIPPED | OUTPATIENT
Start: 2023-08-24 | End: 2024-02-20

## 2023-08-24 RX ORDER — ALLOPURINOL 300 MG/1
300 TABLET ORAL DAILY
Qty: 90 TABLET | Refills: 1 | Status: SHIPPED | OUTPATIENT
Start: 2023-08-24 | End: 2024-02-20

## 2023-08-24 NOTE — RESULT ENCOUNTER NOTE
Sugar, kidney normal  Liver function testing inching up again, likely secondary to an increase in alcohol intake, so please try to curtail that  Cholesterol, uric acid, prostate cancer screening test, normal  No changes to medication recommended so I sent all for 6 months again  Rheumatoid factor is negative, but I am still waiting on some other blood work for lupus and scleroderma

## 2023-08-25 LAB — ANTI-NUCLEAR ANTIBODY (ANA): NEGATIVE

## 2023-08-28 NOTE — RESULT ENCOUNTER NOTE
Antinuclear antibody which is a marker of inflammation is negative.  This means that you do not have underlying scleroderma lupus, or other autoimmune diseases

## 2023-10-09 ENCOUNTER — OFFICE VISIT (OUTPATIENT)
Dept: PRIMARY CARE | Facility: CLINIC | Age: 78
End: 2023-10-09
Payer: MEDICARE

## 2023-10-09 VITALS
SYSTOLIC BLOOD PRESSURE: 132 MMHG | BODY MASS INDEX: 28.35 KG/M2 | WEIGHT: 192 LBS | HEART RATE: 70 BPM | DIASTOLIC BLOOD PRESSURE: 80 MMHG

## 2023-10-09 DIAGNOSIS — F10.10 ALCOHOL ABUSE: ICD-10-CM

## 2023-10-09 DIAGNOSIS — F45.0 ANXIETY WITH SOMATIZATION: Primary | ICD-10-CM

## 2023-10-09 DIAGNOSIS — R22.2 LUMP IN CHEST: ICD-10-CM

## 2023-10-09 DIAGNOSIS — R10.32 LLQ PAIN: ICD-10-CM

## 2023-10-09 DIAGNOSIS — F41.9 ANXIETY WITH SOMATIZATION: Primary | ICD-10-CM

## 2023-10-09 DIAGNOSIS — Z23 NEED FOR INFLUENZA VACCINATION: ICD-10-CM

## 2023-10-09 PROCEDURE — G0008 ADMIN INFLUENZA VIRUS VAC: HCPCS | Performed by: FAMILY MEDICINE

## 2023-10-09 PROCEDURE — 90662 IIV NO PRSV INCREASED AG IM: CPT | Performed by: FAMILY MEDICINE

## 2023-10-09 PROCEDURE — 3075F SYST BP GE 130 - 139MM HG: CPT | Performed by: FAMILY MEDICINE

## 2023-10-09 PROCEDURE — 1160F RVW MEDS BY RX/DR IN RCRD: CPT | Performed by: FAMILY MEDICINE

## 2023-10-09 PROCEDURE — 1159F MED LIST DOCD IN RCRD: CPT | Performed by: FAMILY MEDICINE

## 2023-10-09 PROCEDURE — 1036F TOBACCO NON-USER: CPT | Performed by: FAMILY MEDICINE

## 2023-10-09 PROCEDURE — 3079F DIAST BP 80-89 MM HG: CPT | Performed by: FAMILY MEDICINE

## 2023-10-09 PROCEDURE — 1126F AMNT PAIN NOTED NONE PRSNT: CPT | Performed by: FAMILY MEDICINE

## 2023-10-09 PROCEDURE — 99214 OFFICE O/P EST MOD 30 MIN: CPT | Performed by: FAMILY MEDICINE

## 2023-10-09 ASSESSMENT — PATIENT HEALTH QUESTIONNAIRE - PHQ9
2. FEELING DOWN, DEPRESSED OR HOPELESS: NOT AT ALL
1. LITTLE INTEREST OR PLEASURE IN DOING THINGS: NOT AT ALL
SUM OF ALL RESPONSES TO PHQ9 QUESTIONS 1 AND 2: 0

## 2023-10-09 NOTE — ASSESSMENT & PLAN NOTE
This lump feels like it could be a lymph node but to be sure we will go ahead and ultrasound it, an order has been placed for this

## 2023-10-09 NOTE — ASSESSMENT & PLAN NOTE
As noted, we would prefer that you use a medication rather than alcohol to help with your underlying anxiety and some depression, so were hoping that the conversation we had today will help you continue to realize how unsafe it is for you to continue with excessive alcohol use

## 2023-10-09 NOTE — ASSESSMENT & PLAN NOTE
It's evident that your anxiety has a significant negative impact on both your mental and physical well-being. To address this, we've decided to opt for prescribed medication as a more suitable treatment option compared to alcohol.    Following our discussion, you agreed to restart Lexapro at a daily dose of 5 mg for the first week and then increase it to 10 mg daily as tolerated. We recommend a follow-up appointment with us in 4 to 6 weeks to monitor your progress and make any necessary adjustments.

## 2023-10-09 NOTE — ASSESSMENT & PLAN NOTE
Unclear what may have caused this pain last week but it is reassuring that you have not had any similar or worsening pain since then and your exam today is completely normal  I recommend that you proceed with your scheduled EGD and colonoscopy on 10/27 and follow-up with your gastroenterologist if symptoms persist

## 2023-10-09 NOTE — PROGRESS NOTES
"Subjective   Patient ID: Raúl Cruz is a 77 y.o. male who presents for Panic Attack.    HPI  1.  Anxiety  Victor Manuel has been coping with the loss of his wife, and he recently marked the one-year anniversary of her passing, which proved to be an especially challenging time for him. During this period, he has been self-treating with alcohol, as previously noted.    In an attempt to support Victor Manuel through this difficult period, we initiated a trial of Lexapro at a 5 mg dosage. However, he discontinued the medication after just four days. This decision was influenced by unsettling information he came across online regarding this medication, which, unfortunately, exacerbated his anxiety.    2.  Alcohol abuse  Reports trying to cut back on alcohol intake over the past month  Was previously drinking at least 4 ounces of vodka daily and now drinks only 2 ounces daily or 1/2 glass of wine    3.  LLQ pain  Four days ago, Victor Manuel was seated in a reclined position in his chair at home when he began to experience discomfort in the lower left quadrant (LLQ) of his abdomen. He described the pain as a gradual onset of fullness, which was primarily exacerbated by deep palpation.    Victor Manuel also reported experiencing intense anxiety during this episode, suggesting a potential link between his anxiety and the development of the abdominal discomfort. Fortunately, the pain resolved on its own, and he has not experienced any recurrences since that incident.    It's worth noting that Victor Manuel maintains regular daily bowel movements and denies any symptoms of nausea, vomiting, diarrhea, or the presence of dark or bloody stools.    4.  Lump in chest  Also complains of a lump over one of his ribs on the anterior chest wall just medial to the left nipple which has been present now for \"a while\" and has not changed in size  This is only painful with deep palpation     Review of Systems  All pertinent positive symptoms are included in the history of present " illness.    All other systems have been reviewed and are negative and noncontributory to this patient's current ailments.    Past Medical History:   Diagnosis Date    Abdominal aortic aneurysm, without rupture, unspecified (CMS/[facility]) 01/13/2021    Aneurysm of abdominal aorta    Benign prostatic hyperplasia without lower urinary tract symptoms 08/03/2022    Benign enlargement of prostate    Disorder of prostate, unspecified     Prostate disease    Encounter for immunization 10/21/2020    Flu vaccine need    Essential (primary) hypertension 01/13/2021    Hypertension    Hyperlipidemia, unspecified 08/03/2022    Hyperlipidemia    Peripheral vascular disease, unspecified (CMS/[facility]) 10/09/2019    Peripheral vascular disease    Personal history of other diseases of the digestive system     History of hiatal hernia    Personal history of other diseases of the musculoskeletal system and connective tissue     History of arthritis    Personal history of other specified conditions     History of shortness of breath    Pure hypercholesterolemia, unspecified     High cholesterol     Past Surgical History:   Procedure Laterality Date    APPENDECTOMY  11/12/2013    Appendectomy    CT ABDOMEN PELVIS ANGIOGRAM W AND/OR WO IV CONTRAST  4/16/2015    CT ABDOMEN PELVIS ANGIOGRAM W AND/OR WO IV CONTRAST 4/16/2015 [facility] ANCILLARY LEGACY    CT ABDOMEN PELVIS ANGIOGRAM W AND/OR WO IV CONTRAST  7/5/2021    CT ABDOMEN PELVIS ANGIOGRAM W AND/OR WO IV CONTRAST 7/5/2021 [facility] CLINICAL LEGACY    CT ABDOMEN PELVIS ANGIOGRAM W AND/OR WO IV CONTRAST  10/30/2017    CT ABDOMEN PELVIS ANGIOGRAM W AND/OR WO IV CONTRAST 10/30/2017 [facility] ANCILLARY LEGACY    OTHER SURGICAL HISTORY  11/12/2013    Iliac Thromboendarterectomy    OTHER SURGICAL HISTORY  04/14/2014    Surgery For Abdominal Aortic Aneurysm     Social History     Tobacco Use    Smoking status: Former     Types: Cigarettes    Smokeless tobacco: Never   Substance Use Topics    Alcohol use: Yes     Family  History   Problem Relation Name Age of Onset    Liver cancer Mother      Cancer Brother      Hypertension Other       Allergies   Allergen Reactions    Pollen Extracts Itching and Unknown     Immunization History   Administered Date(s) Administered    Flu vaccine, quadrivalent, high-dose, preservative free, age 65y+ (FLUZONE) 10/09/2023    Influenza, seasonal, injectable 11/01/2022    Pfizer Purple Cap SARS-CoV-2 03/27/2021, 04/17/2021, 12/19/2021    Pneumococcal polysaccharide vaccine, 23-valent, age 2 years and older (PNEUMOVAX 23) 07/23/2019    Tdap vaccine, age 7 year and older (BOOSTRIX) 11/21/2016, 08/03/2022     Current Outpatient Medications   Medication Instructions    allopurinol (ZYLOPRIM) 300 mg, oral, Daily    amLODIPine (NORVASC) 5 mg, oral, Daily    aspirin 81 mg EC tablet 1 tablet, oral, Daily    escitalopram (Lexapro) 10 mg tablet Take 1/2 tablet daily x7 days then 1 tablet daily thereafter    ferrous sulfate 325 (65 Fe) MG tablet oral    metoprolol succinate XL (TOPROL-XL) 25 mg, oral, Daily    rosuvastatin (CRESTOR) 5 mg, oral, Daily     Objective   Visit Vitals  /80   Pulse 70   Wt 87.1 kg (192 lb)   BMI 28.35 kg/m²   Smoking Status Former   BSA 2.06 m²     No visits with results within 1 Month(s) from this visit.   Latest known visit with results is:   Lab on 08/23/2023   Component Date Value    Glucose 08/23/2023 102 (H)     Sodium 08/23/2023 139     Potassium 08/23/2023 5.3     Chloride 08/23/2023 109 (H)     Bicarbonate 08/23/2023 23     Anion Gap 08/23/2023 12     Urea Nitrogen 08/23/2023 10     Creatinine 08/23/2023 1.26     GFR MALE 08/23/2023 58 (A)     Calcium 08/23/2023 9.7     Albumin 08/23/2023 4.6     Alkaline Phosphatase 08/23/2023 51     Total Protein 08/23/2023 7.6     AST 08/23/2023 50 (H)     Total Bilirubin 08/23/2023 0.5     ALT (SGPT) 08/23/2023 26     Cholesterol 08/23/2023 100     HDL 08/23/2023 41.0     Cholesterol/HDL Ratio 08/23/2023 2.4     LDL 08/23/2023 34      VLDL 08/23/2023 25     Triglycerides 08/23/2023 124     Prostate Specific Antige* 08/23/2023 1.87     Uric Acid 08/23/2023 5.0     Rheumatoid Factor 08/23/2023 <10     ANTI-NUCLEAR ANTIBODY (A* 08/23/2023 NEGATIVE      Physical Exam  CONSTITUTIONAL - well nourished, well developed, looks like stated age, in no acute distress, not ill-appearing, and not tired appearing  SKIN - normal skin color and pigmentation, normal skin turgor without rash, lesions, or nodules visualized  ABDOMEN - soft, nontender, nondistended, no organomegaly, BS x4  HEAD - no trauma, normocephalic  CHEST - clear to auscultation, no wheezing, no crackles and no rales, good effort, small firm mobile nodule under the skin on the anterior chest wall near ribs 7-8 medial to the left nipple slightly tender to palpation  CARDIAC - regular rate and regular rhythm, no skipped beats, no murmur  EXTREMITIES - no edema, no deformities  NEUROLOGICAL - normal gait, normal balance, normal motor  PSYCHIATRIC - alert, pleasant and cordial, age-appropriate     Assessment/Plan   Problem List Items Addressed This Visit       Anxiety with somatization - Primary     It's evident that your anxiety has a significant negative impact on both your mental and physical well-being. To address this, we've decided to opt for prescribed medication as a more suitable treatment option compared to alcohol.    Following our discussion, you agreed to restart Lexapro at a daily dose of 5 mg for the first week and then increase it to 10 mg daily as tolerated. We recommend a follow-up appointment with us in 4 to 6 weeks to monitor your progress and make any necessary adjustments.         Alcohol abuse     As noted, we would prefer that you use a medication rather than alcohol to help with your underlying anxiety and some depression, so were hoping that the conversation we had today will help you continue to realize how unsafe it is for you to continue with excessive alcohol use          Need for influenza vaccination     All questions were answered and you were counseled on immunization(s) in detail and vaccination was provided         Relevant Orders    Flu vaccine, quadrivalent, high-dose, preservative free, age 65y+ (FLUZONE) (Completed)    Lump in chest     This lump feels like it could be a lymph node but to be sure we will go ahead and ultrasound it, an order has been placed for this         Relevant Orders    US chest    LLQ pain     Unclear what may have caused this pain last week but it is reassuring that you have not had any similar or worsening pain since then and your exam today is completely normal  I recommend that you proceed with your scheduled EGD and colonoscopy on 10/27 and follow-up with your gastroenterologist if symptoms persist

## 2023-10-11 ENCOUNTER — ANCILLARY PROCEDURE (OUTPATIENT)
Dept: RADIOLOGY | Facility: CLINIC | Age: 78
End: 2023-10-11
Payer: MEDICARE

## 2023-10-11 DIAGNOSIS — R22.2 LUMP IN CHEST: ICD-10-CM

## 2023-10-11 PROCEDURE — 76604 US EXAM CHEST: CPT | Performed by: RADIOLOGY

## 2023-10-11 PROCEDURE — 76604 US EXAM CHEST: CPT

## 2023-10-13 NOTE — RESULT ENCOUNTER NOTE
Ultrasound is negative, if you would like to pursue this a bit further, radiologist suggested we can do a CT scan

## 2023-10-16 ENCOUNTER — TELEPHONE (OUTPATIENT)
Dept: PRIMARY CARE | Facility: CLINIC | Age: 78
End: 2023-10-16
Payer: MEDICARE

## 2023-10-16 NOTE — TELEPHONE ENCOUNTER
----- Message from Viet Del Cid, DO sent at 10/13/2023  7:59 PM EDT -----  Ultrasound is negative, if you would like to pursue this a bit further, radiologist suggested we can do a CT scan    LVM for pt, pt aware

## 2023-10-27 ENCOUNTER — OFFICE VISIT (OUTPATIENT)
Dept: GASTROENTEROLOGY | Facility: EXTERNAL LOCATION | Age: 78
End: 2023-10-27
Payer: MEDICARE

## 2023-10-27 ENCOUNTER — LAB REQUISITION (OUTPATIENT)
Dept: LAB | Facility: HOSPITAL | Age: 78
End: 2023-10-27

## 2023-10-27 DIAGNOSIS — K64.0 FIRST DEGREE HEMORRHOIDS: ICD-10-CM

## 2023-10-27 DIAGNOSIS — D12.5 BENIGN NEOPLASM OF SIGMOID COLON: ICD-10-CM

## 2023-10-27 DIAGNOSIS — F45.8 GLOBUS ABDOMINALIS: Primary | ICD-10-CM

## 2023-10-27 DIAGNOSIS — D12.8 BENIGN NEOPLASM OF RECTUM AND ANAL CANAL: ICD-10-CM

## 2023-10-27 DIAGNOSIS — K21.9 GASTRO-ESOPHAGEAL REFLUX DISEASE WITHOUT ESOPHAGITIS: ICD-10-CM

## 2023-10-27 DIAGNOSIS — Z86.010 PERSONAL HISTORY OF COLONIC POLYPS: ICD-10-CM

## 2023-10-27 DIAGNOSIS — Z12.11 ENCOUNTER FOR SCREENING FOR MALIGNANT NEOPLASM OF COLON: ICD-10-CM

## 2023-10-27 DIAGNOSIS — D12.9 BENIGN NEOPLASM OF RECTUM AND ANAL CANAL: ICD-10-CM

## 2023-10-27 DIAGNOSIS — K29.71 GASTRITIS WITH HEMORRHAGE, UNSPECIFIED CHRONICITY, UNSPECIFIED GASTRITIS TYPE: ICD-10-CM

## 2023-10-27 PROCEDURE — 1160F RVW MEDS BY RX/DR IN RCRD: CPT | Performed by: INTERNAL MEDICINE

## 2023-10-27 PROCEDURE — 88305 TISSUE EXAM BY PATHOLOGIST: CPT | Performed by: SPECIALIST

## 2023-10-27 PROCEDURE — 43239 EGD BIOPSY SINGLE/MULTIPLE: CPT | Performed by: INTERNAL MEDICINE

## 2023-10-27 PROCEDURE — 88305 TISSUE EXAM BY PATHOLOGIST: CPT

## 2023-10-27 PROCEDURE — 1036F TOBACCO NON-USER: CPT | Performed by: INTERNAL MEDICINE

## 2023-10-27 PROCEDURE — 1159F MED LIST DOCD IN RCRD: CPT | Performed by: INTERNAL MEDICINE

## 2023-10-27 PROCEDURE — 1126F AMNT PAIN NOTED NONE PRSNT: CPT | Performed by: INTERNAL MEDICINE

## 2023-10-27 PROCEDURE — 45385 COLONOSCOPY W/LESION REMOVAL: CPT | Performed by: INTERNAL MEDICINE

## 2023-11-09 LAB
LABORATORY COMMENT REPORT: NORMAL
PATH REPORT.FINAL DX SPEC: NORMAL
PATH REPORT.GROSS SPEC: NORMAL
PATH REPORT.RELEVANT HX SPEC: NORMAL
PATH REPORT.TOTAL CANCER: NORMAL

## 2023-11-28 ENCOUNTER — TELEPHONE (OUTPATIENT)
Dept: PHARMACY | Facility: HOSPITAL | Age: 78
End: 2023-11-28
Payer: MEDICARE

## 2023-11-28 NOTE — TELEPHONE ENCOUNTER
Population Health: Outreach by Ambulatory Pharmacy Team    Payor: Joe BABIN  Reason: Adherence  Medication: Rosuvastatin 5 mg   Outcome: Patient Reached: Claims Adherence, patient stated that he still has a lot at home    Jeniffer Christensen, PharmD

## 2024-09-11 ENCOUNTER — APPOINTMENT (OUTPATIENT)
Dept: PRIMARY CARE | Facility: CLINIC | Age: 79
End: 2024-09-11
Payer: MEDICARE

## 2024-09-11 VITALS
HEIGHT: 69 IN | WEIGHT: 198 LBS | DIASTOLIC BLOOD PRESSURE: 82 MMHG | BODY MASS INDEX: 29.33 KG/M2 | HEART RATE: 67 BPM | SYSTOLIC BLOOD PRESSURE: 120 MMHG

## 2024-09-11 DIAGNOSIS — F41.9 ANXIETY AND DEPRESSION: ICD-10-CM

## 2024-09-11 DIAGNOSIS — F32.A ANXIETY AND DEPRESSION: ICD-10-CM

## 2024-09-11 DIAGNOSIS — D52.0 ANEMIA, MACROCYTIC, NUTRITIONAL: ICD-10-CM

## 2024-09-11 DIAGNOSIS — E78.2 MIXED HYPERLIPIDEMIA: ICD-10-CM

## 2024-09-11 DIAGNOSIS — I12.9 BENIGN HYPERTENSION WITH CKD (CHRONIC KIDNEY DISEASE) STAGE I: ICD-10-CM

## 2024-09-11 DIAGNOSIS — E79.0 HYPERURICEMIA: ICD-10-CM

## 2024-09-11 DIAGNOSIS — N18.31 CKD STAGE G3A/A2, GFR 45-59 AND ALBUMIN CREATININE RATIO 30-299 MG/G (MULTI): ICD-10-CM

## 2024-09-11 DIAGNOSIS — Z00.00 PHYSICAL EXAM, ANNUAL: ICD-10-CM

## 2024-09-11 DIAGNOSIS — Z23 FLU VACCINE NEED: ICD-10-CM

## 2024-09-11 DIAGNOSIS — I72.3 ANEURYSM OF COMMON ILIAC ARTERY (CMS-HCC): ICD-10-CM

## 2024-09-11 DIAGNOSIS — N18.1 BENIGN HYPERTENSION WITH CKD (CHRONIC KIDNEY DISEASE) STAGE I: ICD-10-CM

## 2024-09-11 DIAGNOSIS — Z98.890 HISTORY OF AAA (ABDOMINAL AORTIC ANEURYSM) REPAIR: ICD-10-CM

## 2024-09-11 DIAGNOSIS — Z12.5 PROSTATE CANCER SCREENING: ICD-10-CM

## 2024-09-11 DIAGNOSIS — Z00.00 MEDICARE ANNUAL WELLNESS VISIT, SUBSEQUENT: Primary | ICD-10-CM

## 2024-09-11 PROCEDURE — G0439 PPPS, SUBSEQ VISIT: HCPCS | Performed by: FAMILY MEDICINE

## 2024-09-11 PROCEDURE — G0008 ADMIN INFLUENZA VIRUS VAC: HCPCS | Performed by: FAMILY MEDICINE

## 2024-09-11 PROCEDURE — 1036F TOBACCO NON-USER: CPT | Performed by: FAMILY MEDICINE

## 2024-09-11 PROCEDURE — 1124F ACP DISCUSS-NO DSCNMKR DOCD: CPT | Performed by: FAMILY MEDICINE

## 2024-09-11 PROCEDURE — 99397 PER PM REEVAL EST PAT 65+ YR: CPT | Performed by: FAMILY MEDICINE

## 2024-09-11 PROCEDURE — 90662 IIV NO PRSV INCREASED AG IM: CPT | Performed by: FAMILY MEDICINE

## 2024-09-11 PROCEDURE — 3079F DIAST BP 80-89 MM HG: CPT | Performed by: FAMILY MEDICINE

## 2024-09-11 PROCEDURE — 99214 OFFICE O/P EST MOD 30 MIN: CPT | Performed by: FAMILY MEDICINE

## 2024-09-11 PROCEDURE — 1170F FXNL STATUS ASSESSED: CPT | Performed by: FAMILY MEDICINE

## 2024-09-11 PROCEDURE — 3074F SYST BP LT 130 MM HG: CPT | Performed by: FAMILY MEDICINE

## 2024-09-11 PROCEDURE — 1159F MED LIST DOCD IN RCRD: CPT | Performed by: FAMILY MEDICINE

## 2024-09-11 ASSESSMENT — ACTIVITIES OF DAILY LIVING (ADL)
MANAGING_FINANCES: INDEPENDENT
BATHING: INDEPENDENT
GROCERY_SHOPPING: INDEPENDENT
DOING_HOUSEWORK: INDEPENDENT
DRESSING: INDEPENDENT
TAKING_MEDICATION: INDEPENDENT

## 2024-09-11 ASSESSMENT — PATIENT HEALTH QUESTIONNAIRE - PHQ9
1. LITTLE INTEREST OR PLEASURE IN DOING THINGS: NOT AT ALL
SUM OF ALL RESPONSES TO PHQ9 QUESTIONS 1 AND 2: 0
1. LITTLE INTEREST OR PLEASURE IN DOING THINGS: NOT AT ALL
SUM OF ALL RESPONSES TO PHQ9 QUESTIONS 1 AND 2: 0
2. FEELING DOWN, DEPRESSED OR HOPELESS: NOT AT ALL
2. FEELING DOWN, DEPRESSED OR HOPELESS: NOT AT ALL

## 2024-09-11 ASSESSMENT — ENCOUNTER SYMPTOMS
OCCASIONAL FEELINGS OF UNSTEADINESS: 1
LOSS OF SENSATION IN FEET: 0
DEPRESSION: 0

## 2024-09-11 NOTE — ASSESSMENT & PLAN NOTE
Stable, no changes to medication recommended  I am very happy to hear you tell me that you have cut back on your alcohol intake, so continue that taper

## 2024-09-11 NOTE — ASSESSMENT & PLAN NOTE
CTA of chest, abdomen, pelvis  Please schedule at your earliest convenience  We will notify of test results once available

## 2024-09-11 NOTE — ASSESSMENT & PLAN NOTE
Questions answered and reviewed  Colon cancer screening up-to-date  Consider Shingrix at your local pharmacy

## 2024-09-11 NOTE — ASSESSMENT & PLAN NOTE
BP stable, please obtain blood work at your earliest convenience    I would like to have you monitor and record blood pressures at home   Blood pressure goal should be below 130/80, ideally 120/80  If the blood pressure is too high or too low, we need to consider making adjustments to your antihypertensive therapy

## 2024-09-11 NOTE — ASSESSMENT & PLAN NOTE
Please obtain blood work at your earliest convenience to ensure stable  Continue iron supplementation

## 2024-09-11 NOTE — PROGRESS NOTES
Subjective   Reason for Visit: Raúl Cruz is an 78 y.o. male here for a Medicare Annual Wellness Visit Subsequent, Anxiety, Anemia, and Arthritis.     Past Medical, Surgical, and Family History reviewed and updated in chart.    Reviewed all medications by prescribing practitioner or clinical pharmacist (such as prescriptions, OTCs, herbal therapies and supplements) and documented in the medical record.    HPI  1. Medicare wellness/physical exam.  Colonoscopy scheduled for October 2023.  Immunizations: Consider shingles vaccine; all other vaccinations are up-to-date. Patient is interested in receiving the flu vaccine today.    Victor Manuel is interested in knowing whether he should consider taking fish oil capsules and Tums. He is also willing to undergo fasting blood work.    2. Anxiety with depression.  Condition is improving. Patient reports a decreased alcohol intake, now averaging two drinks per day and sometimes abstaining entirely on certain days. He occasionally drinks 5 to 6 days per week, sometimes missing 1 to 2 days. He notes that his alcohol withdrawal symptoms are improving and plans to continue reducing his alcohol intake.    He enjoys spending time with his grandchildren and mentions that his 5-year-old granddaughter recently started . She is very intelligent and keeps him smiling and busy.    3. History of aortic aneurysm s/p repair.  In February 2022, a CTA of the chest, abdomen, and pelvis was performed due to his history of aneurysm repair and associated pain. He is interested in repeating the CTA to ensure there have been no changes to his repair and that no new issues have developed. 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    4. Hyperuricemia.  Taking allopurinol 100 mg daily, tolerating it well.    5. Anemia.  Currently taking over-the-counter iron daily, tolerating it well.    Review of Systems  All pertinent positive symptoms are included in  the history of present illness.    All other systems have been reviewed and are negative and noncontributory to this patient's current ailments.    Past Medical History:   Diagnosis Date    Abdominal aortic aneurysm, without rupture, unspecified (CMS-HCC) 01/13/2021    Aneurysm of abdominal aorta    Benign prostatic hyperplasia without lower urinary tract symptoms 08/03/2022    Benign enlargement of prostate    Disorder of prostate, unspecified     Prostate disease    Encounter for immunization 10/21/2020    Flu vaccine need    Essential (primary) hypertension 01/13/2021    Hypertension    Hyperlipidemia, unspecified 08/03/2022    Hyperlipidemia    Peripheral vascular disease, unspecified (CMS-HCC) 10/09/2019    Peripheral vascular disease    Personal history of other diseases of the digestive system     History of hiatal hernia    Personal history of other diseases of the musculoskeletal system and connective tissue     History of arthritis    Personal history of other specified conditions     History of shortness of breath    Pure hypercholesterolemia, unspecified     High cholesterol     Past Surgical History:   Procedure Laterality Date    APPENDECTOMY  11/12/2013    Appendectomy    CT ABDOMEN PELVIS ANGIOGRAM W AND/OR WO IV CONTRAST  4/16/2015    CT ABDOMEN PELVIS ANGIOGRAM W AND/OR WO IV CONTRAST 4/16/2015 [facility] ANCILLARY LEGACY    CT ABDOMEN PELVIS ANGIOGRAM W AND/OR WO IV CONTRAST  7/5/2021    CT ABDOMEN PELVIS ANGIOGRAM W AND/OR WO IV CONTRAST 7/5/2021 [facility] CLINICAL LEGACY    CT ABDOMEN PELVIS ANGIOGRAM W AND/OR WO IV CONTRAST  10/30/2017    CT ABDOMEN PELVIS ANGIOGRAM W AND/OR WO IV CONTRAST 10/30/2017 [facility] ANCILLARY LEGACY    OTHER SURGICAL HISTORY  11/12/2013    Iliac Thromboendarterectomy    OTHER SURGICAL HISTORY  04/14/2014    Surgery For Abdominal Aortic Aneurysm     Social History     Tobacco Use    Smoking status: Former     Types: Cigarettes    Smokeless tobacco: Never   Substance Use Topics     "Alcohol use: Yes     Family History   Problem Relation Name Age of Onset    Liver cancer Mother      Cancer Brother      Hypertension Other       Allergies   Allergen Reactions    Pollen Extracts Itching and Unknown     Immunization History   Administered Date(s) Administered    Flu vaccine, quadrivalent, high-dose, preservative free, age 65y+ (FLUZONE) 10/09/2023    Flu vaccine, trivalent, preservative free, HIGH-DOSE, age 65y+ (Fluzone) 09/11/2024    Influenza, seasonal, injectable 11/01/2022    Pfizer Purple Cap SARS-CoV-2 03/27/2021, 04/17/2021, 12/19/2021    Pneumococcal polysaccharide vaccine, 23-valent, age 2 years and older (PNEUMOVAX 23) 07/23/2019    Tdap vaccine, age 7 year and older (BOOSTRIX, ADACEL) 11/21/2016, 08/03/2022     Current Outpatient Medications   Medication Instructions    amLODIPine (NORVASC) 5 mg, oral, Daily    aspirin 81 mg EC tablet 1 tablet, oral, Daily    escitalopram (Lexapro) 10 mg tablet Take 1/2 tablet daily x7 days then 1 tablet daily thereafter    ferrous sulfate 325 (65 Fe) MG tablet oral    metoprolol succinate XL (TOPROL-XL) 25 mg, oral, Daily    rosuvastatin (CRESTOR) 5 mg, oral, Daily     Patient Self Assessment of Health Status  Patient Self Assessment: Good    Nutrition and Exercise  Current Diet: Well Balanced Diet  Adequate Fluid Intake: Yes  Caffeine: Yes  Exercise Frequency: Infrequently    Functional Ability/Level of Safety  Cognitive Impairment Observed: No cognitive impairment observed  Cognitive Impairment Reported: No cognitive impairment reported by patient or family    Home Safety Risk Factors: None    Objective   Visit Vitals  /82   Pulse 67   Ht 1.753 m (5' 9\")   Wt 89.8 kg (198 lb)   BMI 29.24 kg/m²   Smoking Status Former   BSA 2.09 m²      No visits with results within 1 Month(s) from this visit.   Latest known visit with results is:   Lab Requisition on 10/27/2023   Component Date Value    Case Report 10/27/2023                      Value:Surgical " "Pathology                                Case: [id number]                                  Authorizing Provider:  Sohan Stout MD      Collected:           10/27/2023 1127              Ordering Location:     [facility]       Received:            10/27/2023 1900                                     Center                                                                       Pathologist:           Valerie Solitario MD PhD                                                               Specimens:   A) - STOMACH ANTRUM BIOPSY, COLD FORCEP, BIOPSY                                                     B) - COLON - SIGMOID POLYP, SIGMOID X1, RECTUM X1, COLD SNARE, POLYPECTOMY                 FINAL DIAGNOSIS 10/27/2023                      Value:A. STOMACH, ANTRUM, BIOPSY:                           -- Gastric mucosa with mild chronic nonspecific inflammation.                          -- No Helicobacter pylori-like organisms identified.                                                    B. SIGMOID POLYP, POLYPECTOMY:                          -- Tubular adenoma x 2, see note.                                                    Note: Deeper levels of sections were reviewed.      10/27/2023                      Value:By the signature on this report, the individual or group listed as making                           the Final Interpretation/Diagnosis certifies that they have reviewed this                           case.     Clinical History 10/27/2023                      Value:Previously treated for Helicobacter Pylori                          Globus sensation.    Gross Description 10/27/2023                      Value:A.  Received in formalin, labeled with the patient's name and hospital                           number and \" body, antrum, biopsy\", are 5 fragments of tan soft tissue                           aggregating to 0.8 x 0.3 x 0.3 cm.  The specimen is entirely submitted in                           cassette 1.          " "                [facility]                          B.  Received in formalin, labeled with the patient's name and hospital                           number and \" sigmoid x 1, rectum x 1, polyp, cold snare, polypectomy\", are                           2 fragments of tan soft tissue aggregating to 0.6 x 0.3 x 0.3 cm.  The                           specimen is entirely submitted in cassette 1.                          [facility]     The ASCVD Risk score (Dominic MATUTE, et al., 2019) failed to calculate for the following reasons:    The 2019 ASCVD risk score is only valid for ages 40 to 79    Physical Exam  CONSTITUTIONAL - well nourished, well developed, looks like stated age, in no acute distress, not ill-appearing, and not tired appearing  SKIN - normal skin color and pigmentation, normal skin turgor without rash, lesions, or nodules visualized  HEAD - no trauma, normocephalic  EYES - pupils are equal and reactive to light, extraocular muscles are intact, and normal external exam  CHEST - clear to auscultation, no wheezing, no crackles and no rales, good effort  CARDIAC - regular rate and regular rhythm, no skipped beats, no murmur  EXTREMITIES - no obvious or evident edema, no obvious or evident deformities  NEUROLOGICAL - normal gait, normal balance, normal motor, no ataxia, alert, oriented and no focal signs  PSYCHIATRIC - alert, pleasant and cordial, age-appropriate    Assessment/Plan   Problem List Items Addressed This Visit       Anemia, macrocytic, nutritional    Current Assessment & Plan     Please obtain blood work at your earliest convenience to ensure stable  Continue iron supplementation         Relevant Orders    CBC and Auto Differential    Benign hypertension with CKD (chronic kidney disease) stage I    Current Assessment & Plan     BP stable, please obtain blood work at your earliest convenience    I would like to have you monitor and record blood pressures at home   Blood pressure goal should be below 130/80, ideally " 120/80  If the blood pressure is too high or too low, we need to consider making adjustments to your antihypertensive therapy         Relevant Orders    Comprehensive Metabolic Panel    CKD stage G3a/A2, GFR 45-59 and albumin creatinine ratio  mg/g (Multi)    Current Assessment & Plan     We will continue to monitor with blood work  Maintain proper hydration throughout the day         Hyperlipidemia    Current Assessment & Plan     Stable, no changes to medication recommended  Please obtain fasting blood work to further evaluate to ensure proper dosing         Relevant Orders    Lipid Panel    Hyperuricemia    Current Assessment & Plan     Continue on current management plan  Will check uric acid levels with your upcoming blood draw         Relevant Orders    Uric acid    Medicare annual wellness visit, subsequent - Primary    Current Assessment & Plan     Questions answered and reviewed  Colon cancer screening up-to-date  Consider Shingrix at your local pharmacy         Physical exam, annual    Current Assessment & Plan     Complete history and physical examination was performed  We will notify of test results once available  Tums can be used for indigestion, and they are a good source for calcium  Fish oil capsules less than 3600 mg daily are not very beneficial         Anxiety and depression    Current Assessment & Plan     Stable, no changes to medication recommended  I am very happy to hear you tell me that you have cut back on your alcohol intake, so continue that taper         Aneurysm of common iliac artery (CMS-HCC)    Current Assessment & Plan     CTA of chest, abdomen, pelvis  Please schedule at your earliest convenience  We will notify of test results once available         History of AAA (abdominal aortic aneurysm) repair    Current Assessment & Plan     CTA of abdomen and pelvis forthcoming          Other Visit Diagnoses       Flu vaccine need        All questions were answered and you were  counseled on immunization(s) in detail and vaccination was provided    Relevant Orders    Flu vaccine, trivalent, preservative free, HIGH-DOSE, age 65y+ (Fluzone) (Completed)    Prostate cancer screening        Relevant Orders    Prostate Specific Antigen, Screen          Patient Care Team:  Viet Del Cid DO as PCP - General (Family Medicine)  Viet Del Cid DO as PCP - tna Medicare Advantage PCP

## 2024-09-11 NOTE — ASSESSMENT & PLAN NOTE
Complete history and physical examination was performed  We will notify of test results once available  Tums can be used for indigestion, and they are a good source for calcium  Fish oil capsules less than 3600 mg daily are not very beneficial

## 2024-09-13 ENCOUNTER — LAB (OUTPATIENT)
Dept: LAB | Facility: LAB | Age: 79
End: 2024-09-13
Payer: MEDICARE

## 2024-09-13 DIAGNOSIS — D52.0 ANEMIA, MACROCYTIC, NUTRITIONAL: ICD-10-CM

## 2024-09-13 DIAGNOSIS — E79.0 HYPERURICEMIA: ICD-10-CM

## 2024-09-13 DIAGNOSIS — N18.1 BENIGN HYPERTENSION WITH CKD (CHRONIC KIDNEY DISEASE) STAGE I: ICD-10-CM

## 2024-09-13 DIAGNOSIS — I12.9 BENIGN HYPERTENSION WITH CKD (CHRONIC KIDNEY DISEASE) STAGE I: ICD-10-CM

## 2024-09-13 DIAGNOSIS — E78.2 MIXED HYPERLIPIDEMIA: ICD-10-CM

## 2024-09-13 DIAGNOSIS — Z12.5 PROSTATE CANCER SCREENING: ICD-10-CM

## 2024-09-13 LAB
ALBUMIN SERPL BCP-MCNC: 4.4 G/DL (ref 3.4–5)
ALP SERPL-CCNC: 60 U/L (ref 33–136)
ALT SERPL W P-5'-P-CCNC: 15 U/L (ref 10–52)
ANION GAP SERPL CALC-SCNC: 15 MMOL/L (ref 10–20)
AST SERPL W P-5'-P-CCNC: 23 U/L (ref 9–39)
BASOPHILS # BLD AUTO: 0.03 X10*3/UL (ref 0–0.1)
BASOPHILS NFR BLD AUTO: 0.5 %
BILIRUB SERPL-MCNC: 0.6 MG/DL (ref 0–1.2)
BUN SERPL-MCNC: 12 MG/DL (ref 6–23)
CALCIUM SERPL-MCNC: 9.6 MG/DL (ref 8.6–10.6)
CHLORIDE SERPL-SCNC: 104 MMOL/L (ref 98–107)
CHOLEST SERPL-MCNC: 114 MG/DL (ref 0–199)
CHOLESTEROL/HDL RATIO: 3.1
CO2 SERPL-SCNC: 26 MMOL/L (ref 21–32)
CREAT SERPL-MCNC: 1.35 MG/DL (ref 0.5–1.3)
EGFRCR SERPLBLD CKD-EPI 2021: 53 ML/MIN/1.73M*2
EOSINOPHIL # BLD AUTO: 0.05 X10*3/UL (ref 0–0.4)
EOSINOPHIL NFR BLD AUTO: 0.9 %
ERYTHROCYTE [DISTWIDTH] IN BLOOD BY AUTOMATED COUNT: 14 % (ref 11.5–14.5)
GLUCOSE SERPL-MCNC: 106 MG/DL (ref 74–99)
HCT VFR BLD AUTO: 42.6 % (ref 41–52)
HDLC SERPL-MCNC: 37 MG/DL
HGB BLD-MCNC: 13.4 G/DL (ref 13.5–17.5)
IMM GRANULOCYTES # BLD AUTO: 0.01 X10*3/UL (ref 0–0.5)
IMM GRANULOCYTES NFR BLD AUTO: 0.2 % (ref 0–0.9)
LDLC SERPL CALC-MCNC: 31 MG/DL
LYMPHOCYTES # BLD AUTO: 2.15 X10*3/UL (ref 0.8–3)
LYMPHOCYTES NFR BLD AUTO: 36.7 %
MCH RBC QN AUTO: 30.4 PG (ref 26–34)
MCHC RBC AUTO-ENTMCNC: 31.5 G/DL (ref 32–36)
MCV RBC AUTO: 97 FL (ref 80–100)
MONOCYTES # BLD AUTO: 0.47 X10*3/UL (ref 0.05–0.8)
MONOCYTES NFR BLD AUTO: 8 %
NEUTROPHILS # BLD AUTO: 3.15 X10*3/UL (ref 1.6–5.5)
NEUTROPHILS NFR BLD AUTO: 53.7 %
NON HDL CHOLESTEROL: 77 MG/DL (ref 0–149)
NRBC BLD-RTO: 0 /100 WBCS (ref 0–0)
PLATELET # BLD AUTO: 208 X10*3/UL (ref 150–450)
POTASSIUM SERPL-SCNC: 4.7 MMOL/L (ref 3.5–5.3)
PROT SERPL-MCNC: 7.3 G/DL (ref 6.4–8.2)
PSA SERPL-MCNC: 1.82 NG/ML
RBC # BLD AUTO: 4.41 X10*6/UL (ref 4.5–5.9)
SODIUM SERPL-SCNC: 140 MMOL/L (ref 136–145)
TRIGL SERPL-MCNC: 230 MG/DL (ref 0–149)
URATE SERPL-MCNC: 5.9 MG/DL (ref 4–7.5)
VLDL: 46 MG/DL (ref 0–40)
WBC # BLD AUTO: 5.9 X10*3/UL (ref 4.4–11.3)

## 2024-09-13 PROCEDURE — 85025 COMPLETE CBC W/AUTO DIFF WBC: CPT

## 2024-09-13 PROCEDURE — 36415 COLL VENOUS BLD VENIPUNCTURE: CPT

## 2024-09-13 PROCEDURE — 84550 ASSAY OF BLOOD/URIC ACID: CPT

## 2024-09-13 PROCEDURE — 80061 LIPID PANEL: CPT

## 2024-09-13 PROCEDURE — 80053 COMPREHEN METABOLIC PANEL: CPT

## 2024-09-13 PROCEDURE — G0103 PSA SCREENING: HCPCS

## 2024-09-15 NOTE — RESULT ENCOUNTER NOTE
Glucose still prediabetic at 106 previous 102  Electrolytes, liver normal  Kidney function is stable with creatinine 1.35, GFR 53 which is exactly where you tend to hover  Cholesterol excellent at 114 with LDL 31 although triglycerides are up a bit 230  Complete blood count even looks better, your anemia is still present but much improved since last year  Prostate cancer screening test is stable at 1.82 previous 1.87  Uric acid is very good at 5.9  Let me know if you are still taking allopurinol, and if you are if it is needed  If any medications are necessary to send to the pharmacy, let us know which ones are needed so that we can send them

## 2024-09-23 DIAGNOSIS — E79.0 HYPERURICEMIA: ICD-10-CM

## 2024-09-23 DIAGNOSIS — I12.9 BENIGN HYPERTENSION WITH CKD (CHRONIC KIDNEY DISEASE) STAGE I: ICD-10-CM

## 2024-09-23 DIAGNOSIS — N18.1 BENIGN HYPERTENSION WITH CKD (CHRONIC KIDNEY DISEASE) STAGE I: ICD-10-CM

## 2024-09-23 DIAGNOSIS — E78.2 MIXED HYPERLIPIDEMIA: ICD-10-CM

## 2024-09-23 DIAGNOSIS — Z95.828 HISTORY OF REPAIR OF ANEURYSM OF ABDOMINAL AORTA USING ENDOVASCULAR STENT GRAFT: Primary | ICD-10-CM

## 2024-09-23 RX ORDER — ALLOPURINOL 300 MG/1
300 TABLET ORAL DAILY
Qty: 90 TABLET | Refills: 1 | Status: SHIPPED | OUTPATIENT
Start: 2024-09-23 | End: 2025-03-22

## 2024-09-23 RX ORDER — AMLODIPINE BESYLATE 5 MG/1
5 TABLET ORAL DAILY
Qty: 90 TABLET | Refills: 1 | Status: SHIPPED | OUTPATIENT
Start: 2024-09-23 | End: 2025-03-22

## 2024-09-23 RX ORDER — METOPROLOL SUCCINATE 25 MG/1
25 TABLET, EXTENDED RELEASE ORAL DAILY
Qty: 90 TABLET | Refills: 1 | Status: SHIPPED | OUTPATIENT
Start: 2024-09-23 | End: 2025-03-22

## 2024-09-23 RX ORDER — ROSUVASTATIN CALCIUM 5 MG/1
5 TABLET, COATED ORAL DAILY
Qty: 90 TABLET | Refills: 1 | Status: SHIPPED | OUTPATIENT
Start: 2024-09-23 | End: 2025-03-22

## 2024-10-03 ENCOUNTER — HOSPITAL ENCOUNTER (EMERGENCY)
Facility: HOSPITAL | Age: 79
Discharge: HOME | End: 2024-10-03
Attending: EMERGENCY MEDICINE
Payer: MEDICARE

## 2024-10-03 ENCOUNTER — APPOINTMENT (OUTPATIENT)
Dept: CARDIOLOGY | Facility: HOSPITAL | Age: 79
End: 2024-10-03
Payer: MEDICARE

## 2024-10-03 ENCOUNTER — APPOINTMENT (OUTPATIENT)
Dept: RADIOLOGY | Facility: HOSPITAL | Age: 79
End: 2024-10-03
Payer: MEDICARE

## 2024-10-03 VITALS
OXYGEN SATURATION: 99 % | SYSTOLIC BLOOD PRESSURE: 126 MMHG | DIASTOLIC BLOOD PRESSURE: 79 MMHG | WEIGHT: 199 LBS | HEIGHT: 69 IN | HEART RATE: 58 BPM | TEMPERATURE: 97.7 F | BODY MASS INDEX: 29.47 KG/M2 | RESPIRATION RATE: 16 BRPM

## 2024-10-03 DIAGNOSIS — K85.20 ALCOHOL-INDUCED ACUTE PANCREATITIS, UNSPECIFIED COMPLICATION STATUS (HHS-HCC): Primary | ICD-10-CM

## 2024-10-03 LAB
ALBUMIN SERPL BCP-MCNC: 4.1 G/DL (ref 3.4–5)
ALP SERPL-CCNC: 62 U/L (ref 33–136)
ALT SERPL W P-5'-P-CCNC: 14 U/L (ref 10–52)
ANION GAP SERPL CALC-SCNC: 14 MMOL/L (ref 10–20)
AST SERPL W P-5'-P-CCNC: 25 U/L (ref 9–39)
BASOPHILS # BLD AUTO: 0.02 X10*3/UL (ref 0–0.1)
BASOPHILS NFR BLD AUTO: 0.3 %
BILIRUB SERPL-MCNC: 0.8 MG/DL (ref 0–1.2)
BUN SERPL-MCNC: 12 MG/DL (ref 6–23)
CALCIUM SERPL-MCNC: 8.9 MG/DL (ref 8.6–10.3)
CHLORIDE SERPL-SCNC: 99 MMOL/L (ref 98–107)
CO2 SERPL-SCNC: 23 MMOL/L (ref 21–32)
CREAT SERPL-MCNC: 1.16 MG/DL (ref 0.5–1.3)
EGFRCR SERPLBLD CKD-EPI 2021: 64 ML/MIN/1.73M*2
EOSINOPHIL # BLD AUTO: 0.02 X10*3/UL (ref 0–0.4)
EOSINOPHIL NFR BLD AUTO: 0.3 %
ERYTHROCYTE [DISTWIDTH] IN BLOOD BY AUTOMATED COUNT: 13.3 % (ref 11.5–14.5)
GLUCOSE SERPL-MCNC: 115 MG/DL (ref 74–99)
HCT VFR BLD AUTO: 41.2 % (ref 41–52)
HGB BLD-MCNC: 13.9 G/DL (ref 13.5–17.5)
IMM GRANULOCYTES # BLD AUTO: 0.03 X10*3/UL (ref 0–0.5)
IMM GRANULOCYTES NFR BLD AUTO: 0.4 % (ref 0–0.9)
LIPASE SERPL-CCNC: 191 U/L (ref 9–82)
LYMPHOCYTES # BLD AUTO: 1.14 X10*3/UL (ref 0.8–3)
LYMPHOCYTES NFR BLD AUTO: 15.3 %
MCH RBC QN AUTO: 30.8 PG (ref 26–34)
MCHC RBC AUTO-ENTMCNC: 33.7 G/DL (ref 32–36)
MCV RBC AUTO: 91 FL (ref 80–100)
MONOCYTES # BLD AUTO: 0.49 X10*3/UL (ref 0.05–0.8)
MONOCYTES NFR BLD AUTO: 6.6 %
NEUTROPHILS # BLD AUTO: 5.76 X10*3/UL (ref 1.6–5.5)
NEUTROPHILS NFR BLD AUTO: 77.1 %
NRBC BLD-RTO: 0 /100 WBCS (ref 0–0)
PLATELET # BLD AUTO: 169 X10*3/UL (ref 150–450)
POTASSIUM SERPL-SCNC: 3.8 MMOL/L (ref 3.5–5.3)
PROT SERPL-MCNC: 6.8 G/DL (ref 6.4–8.2)
RBC # BLD AUTO: 4.51 X10*6/UL (ref 4.5–5.9)
SODIUM SERPL-SCNC: 132 MMOL/L (ref 136–145)
WBC # BLD AUTO: 7.5 X10*3/UL (ref 4.4–11.3)

## 2024-10-03 PROCEDURE — 2550000001 HC RX 255 CONTRASTS: Performed by: EMERGENCY MEDICINE

## 2024-10-03 PROCEDURE — 84075 ASSAY ALKALINE PHOSPHATASE: CPT | Performed by: EMERGENCY MEDICINE

## 2024-10-03 PROCEDURE — 74174 CTA ABD&PLVS W/CONTRAST: CPT | Performed by: RADIOLOGY

## 2024-10-03 PROCEDURE — 99285 EMERGENCY DEPT VISIT HI MDM: CPT | Mod: 25

## 2024-10-03 PROCEDURE — 36415 COLL VENOUS BLD VENIPUNCTURE: CPT | Performed by: EMERGENCY MEDICINE

## 2024-10-03 PROCEDURE — 83690 ASSAY OF LIPASE: CPT | Performed by: EMERGENCY MEDICINE

## 2024-10-03 PROCEDURE — 71275 CT ANGIOGRAPHY CHEST: CPT

## 2024-10-03 PROCEDURE — 85025 COMPLETE CBC W/AUTO DIFF WBC: CPT | Performed by: EMERGENCY MEDICINE

## 2024-10-03 PROCEDURE — 93005 ELECTROCARDIOGRAM TRACING: CPT

## 2024-10-03 PROCEDURE — 71275 CT ANGIOGRAPHY CHEST: CPT | Performed by: RADIOLOGY

## 2024-10-03 RX ORDER — ONDANSETRON 4 MG/1
4 TABLET, ORALLY DISINTEGRATING ORAL EVERY 8 HOURS PRN
Qty: 9 TABLET | Refills: 0 | Status: SHIPPED | OUTPATIENT
Start: 2024-10-03 | End: 2024-10-06

## 2024-10-03 RX ADMIN — IOHEXOL 90 ML: 350 INJECTION, SOLUTION INTRAVENOUS at 14:52

## 2024-10-03 ASSESSMENT — PAIN - FUNCTIONAL ASSESSMENT: PAIN_FUNCTIONAL_ASSESSMENT: 0-10

## 2024-10-03 ASSESSMENT — COLUMBIA-SUICIDE SEVERITY RATING SCALE - C-SSRS
1. IN THE PAST MONTH, HAVE YOU WISHED YOU WERE DEAD OR WISHED YOU COULD GO TO SLEEP AND NOT WAKE UP?: NO
2. HAVE YOU ACTUALLY HAD ANY THOUGHTS OF KILLING YOURSELF?: NO
6. HAVE YOU EVER DONE ANYTHING, STARTED TO DO ANYTHING, OR PREPARED TO DO ANYTHING TO END YOUR LIFE?: NO

## 2024-10-03 ASSESSMENT — PAIN SCALES - GENERAL: PAINLEVEL_OUTOF10: 8

## 2024-10-03 ASSESSMENT — PAIN DESCRIPTION - DESCRIPTORS: DESCRIPTORS: ACHING

## 2024-10-03 ASSESSMENT — PAIN DESCRIPTION - LOCATION: LOCATION: ABDOMEN

## 2024-10-03 ASSESSMENT — PAIN DESCRIPTION - ORIENTATION: ORIENTATION: LOWER

## 2024-10-03 NOTE — DISCHARGE INSTRUCTIONS
May take Zofran as needed for nausea.  Avoid excessive alcohol as this is likely to cause pancreatitis flare.  Return to emergency department for reassessment if new or worsening symptoms especially severe pain, persistent vomiting, fever, inability to eat and drink.

## 2024-10-03 NOTE — ED PROVIDER NOTES
Chief Complaint   Patient presents with    Abdominal Pain     History of Present Illness   Raúl Cruz   MRN [id number]    78-year-old presents for evaluation of abdominal pain.  He describes this as central without radiation associated with nausea but no vomiting.  Poor appetite and was able to eat only dry Cheerios and small sips of water this morning.  Pains been present and constant for the past 3 days.  No associated chest pain, shortness of breath, fever, urinary symptoms.  Believes that symptoms feel similar to previous episode of pancreatitis.  Patient states that he overindulged on vodka just prior to symptoms beginning.  Declines analgesics or antiemetics at this time.    External records reviewed including primary care physician note from 9/11/2024.  Chronic medical conditions including anxiety and depression, aortic aneurysm repair in 2014, 2.1 cm left common iliac aneurysm, hypertension, prostate enlargement, peripheral vascular disease, hyperlipidemia, arthritis    Physical Exam   T 36.5 °C (97.7 °F)  HR 71  /79  RR 16  O2 99 %      General:  No acute distress.  Head: Atraumatic.  Eyes: No conjunctival injection.   Ears Nose Throat: No epistaxis. Oral mucosa moist.  Neck: Supple.  Cardiovascular: Extremities warm.   Pulmonary: No stridor. Normal respiratory effort.  Abdominal: Mild distention.  Soft.  No significant tenderness to palpation.  No rebound or guarding.  Musculoskeletal: No deformity or joint swelling.  Skin: No rash.  Psychiatric: Does not appear to respond to internal stimuli.  Neurologic: Alert. Follows directions. Face symmetric. No aphasia or dysarthria. Symmetric movement of upper and lower extremities.    ED Course & Medical Decision Making   Initial vital signs within normal limits.  Patient was nontoxic-appearing.  He reported 3 days of abdominal pain with very poor appetite and limited oral intake.  Given temporal association with excessive alcohol consumption  suspect most likely pancreatitis however CT angio chest abdomen pelvis to exclude complications due to remote aortic aneurysm repair.    Labs demonstrated no leukocytosis anemia or thrombocytopenia.  Mild hyponatremia sodium 132 and mildly elevated glucose 115 with normal serum bicarb and anion gap otherwise unremarkable metabolic panel.  Lipase mildly elevated 191.  CT imaging demonstrated stable appearance of aorto by iliac stent graft and stable 2.1 cm left common iliac artery aneurysm as well as peripancreatic edema with peripheral fat stranding reflective of interstitial pancreatitis.  On reassessment patient was well-appearing and reported no current pain or nausea.  Drink fluids and ate goldfish crackers and stated he felt comfortable to return home.  Given prescription for Zofran to be taken as needed for nausea.  Advised him to return to emergency department for reassessment if new or worsening symptoms.    ED Course as of 10/03/24 1836   Thu Oct 03, 2024   1330 ECG 12 lead  Interpreted by me.  10/3/2024 at 1321.  Sinus rhythm 60 bpm.    QTc 474.  No ST elevation. [MC]      ED Course User Index  [MC] Sreekanth Heath MD         Diagnoses as of 10/03/24 1836   Alcohol-induced acute pancreatitis, unspecified complication status ([facility])            Sreekanth Heath MD  10/03/24 1836

## 2024-10-03 NOTE — ED TRIAGE NOTES
PT is A/Ox4 coming in for ABD pain.PT stated tat he has had pancreatis in the past. PT stated that he was drinking vodka and juice all last week and thins that caused a flare up. No other complaints at this time.

## 2024-10-05 LAB
ATRIAL RATE: 60 BPM
P AXIS: 49 DEGREES
P OFFSET: 211 MS
P ONSET: 148 MS
PR INTERVAL: 142 MS
Q ONSET: 219 MS
QRS COUNT: 10 BEATS
QRS DURATION: 114 MS
QT INTERVAL: 474 MS
QTC CALCULATION(BAZETT): 474 MS
QTC FREDERICIA: 474 MS
R AXIS: -18 DEGREES
T AXIS: 43 DEGREES
T OFFSET: 456 MS
VENTRICULAR RATE: 60 BPM

## 2024-10-10 ENCOUNTER — APPOINTMENT (OUTPATIENT)
Dept: RADIOLOGY | Facility: CLINIC | Age: 79
End: 2024-10-10
Payer: MEDICARE